# Patient Record
Sex: MALE | Race: WHITE | NOT HISPANIC OR LATINO
[De-identification: names, ages, dates, MRNs, and addresses within clinical notes are randomized per-mention and may not be internally consistent; named-entity substitution may affect disease eponyms.]

---

## 2017-08-01 ENCOUNTER — TRANSCRIPTION ENCOUNTER (OUTPATIENT)
Age: 62
End: 2017-08-01

## 2018-01-09 ENCOUNTER — TRANSCRIPTION ENCOUNTER (OUTPATIENT)
Age: 63
End: 2018-01-09

## 2019-07-24 PROBLEM — Z00.00 ENCOUNTER FOR PREVENTIVE HEALTH EXAMINATION: Status: ACTIVE | Noted: 2019-07-24

## 2019-08-09 ENCOUNTER — APPOINTMENT (OUTPATIENT)
Dept: OTOLARYNGOLOGY | Facility: CLINIC | Age: 64
End: 2019-08-09
Payer: COMMERCIAL

## 2019-08-09 VITALS — BODY MASS INDEX: 27.32 KG/M2 | HEIGHT: 66 IN | WEIGHT: 170 LBS

## 2019-08-09 DIAGNOSIS — Z87.09 PERSONAL HISTORY OF OTHER DISEASES OF THE RESPIRATORY SYSTEM: ICD-10-CM

## 2019-08-09 DIAGNOSIS — K21.9 GASTRO-ESOPHAGEAL REFLUX DISEASE W/OUT ESOPHAGITIS: ICD-10-CM

## 2019-08-09 DIAGNOSIS — R49.0 DYSPHONIA: ICD-10-CM

## 2019-08-09 DIAGNOSIS — Z87.19 PERSONAL HISTORY OF OTHER DISEASES OF THE DIGESTIVE SYSTEM: ICD-10-CM

## 2019-08-09 DIAGNOSIS — J34.2 DEVIATED NASAL SEPTUM: ICD-10-CM

## 2019-08-09 DIAGNOSIS — Z86.79 PERSONAL HISTORY OF OTHER DISEASES OF THE CIRCULATORY SYSTEM: ICD-10-CM

## 2019-08-09 DIAGNOSIS — Z86.73 PERSONAL HISTORY OF TRANSIENT ISCHEMIC ATTACK (TIA), AND CEREBRAL INFARCTION W/OUT RESIDUAL DEFICITS: ICD-10-CM

## 2019-08-09 DIAGNOSIS — Z87.39 PERSONAL HISTORY OF OTHER DISEASES OF THE MUSCULOSKELETAL SYSTEM AND CONNECTIVE TISSUE: ICD-10-CM

## 2019-08-09 DIAGNOSIS — J31.0 CHRONIC RHINITIS: ICD-10-CM

## 2019-08-09 PROCEDURE — 99214 OFFICE O/P EST MOD 30 MIN: CPT | Mod: 25

## 2019-08-09 PROCEDURE — 31231 NASAL ENDOSCOPY DX: CPT

## 2019-08-09 NOTE — REASON FOR VISIT
[Subsequent Evaluation] : a subsequent evaluation for [Gastroesophageal Reflux] : gastroesophageal reflux [Hoarseness/Dysphonia] : hoarseness

## 2019-08-09 NOTE — REVIEW OF SYSTEMS
[Sneezing] : sneezing [Seasonal Allergies] : seasonal allergies [Dizziness] : dizziness [Nasal Congestion] : nasal congestion [Lightheadedness] : lightheadedness [Hoarseness] : hoarseness [Sinus Pressure] : sinus pressure [Sinus Pain] : sinus pain [Throat Itching] : throat itching [Abdominal Pain] : abdominal pain [Diarrhea] : diarrhea [Constipation] : constipation [Heartburn] : heartburn [Anxiety] : anxiety [Negative] : Heme/Lymph

## 2019-08-29 ENCOUNTER — APPOINTMENT (OUTPATIENT)
Dept: OTOLARYNGOLOGY | Facility: CLINIC | Age: 64
End: 2019-08-29

## 2020-09-06 ENCOUNTER — TRANSCRIPTION ENCOUNTER (OUTPATIENT)
Age: 65
End: 2020-09-06

## 2020-09-06 ENCOUNTER — INPATIENT (INPATIENT)
Facility: HOSPITAL | Age: 65
LOS: 2 days | Discharge: HOME HEALTH | DRG: 562 | End: 2020-09-09
Attending: SURGERY | Admitting: SURGERY
Payer: COMMERCIAL

## 2020-09-06 ENCOUNTER — EMERGENCY (EMERGENCY)
Facility: HOSPITAL | Age: 65
LOS: 1 days | Discharge: ANOTHER HOSPITAL | End: 2020-09-06
Attending: EMERGENCY MEDICINE | Admitting: EMERGENCY MEDICINE
Payer: COMMERCIAL

## 2020-09-06 VITALS
SYSTOLIC BLOOD PRESSURE: 148 MMHG | HEART RATE: 78 BPM | DIASTOLIC BLOOD PRESSURE: 66 MMHG | OXYGEN SATURATION: 99 % | TEMPERATURE: 98 F | RESPIRATION RATE: 18 BRPM

## 2020-09-06 VITALS
HEART RATE: 71 BPM | TEMPERATURE: 98 F | DIASTOLIC BLOOD PRESSURE: 70 MMHG | SYSTOLIC BLOOD PRESSURE: 150 MMHG | OXYGEN SATURATION: 97 % | RESPIRATION RATE: 17 BRPM

## 2020-09-06 VITALS
RESPIRATION RATE: 18 BRPM | OXYGEN SATURATION: 98 % | HEIGHT: 66 IN | DIASTOLIC BLOOD PRESSURE: 75 MMHG | HEART RATE: 96 BPM | SYSTOLIC BLOOD PRESSURE: 184 MMHG | TEMPERATURE: 98 F | WEIGHT: 184.97 LBS

## 2020-09-06 DIAGNOSIS — Z98.890 OTHER SPECIFIED POSTPROCEDURAL STATES: Chronic | ICD-10-CM

## 2020-09-06 DIAGNOSIS — K60.2 ANAL FISSURE, UNSPECIFIED: Chronic | ICD-10-CM

## 2020-09-06 DIAGNOSIS — S62.101A FRACTURE OF UNSPECIFIED CARPAL BONE, RIGHT WRIST, INITIAL ENCOUNTER FOR CLOSED FRACTURE: ICD-10-CM

## 2020-09-06 LAB
ALBUMIN SERPL ELPH-MCNC: 3.9 G/DL — SIGNIFICANT CHANGE UP (ref 3.3–5.2)
ALP SERPL-CCNC: 42 U/L — SIGNIFICANT CHANGE UP (ref 40–120)
ALT FLD-CCNC: 46 U/L — HIGH
ANION GAP SERPL CALC-SCNC: 14 MMOL/L — SIGNIFICANT CHANGE UP (ref 5–17)
ANION GAP SERPL CALC-SCNC: 7 MMOL/L — SIGNIFICANT CHANGE UP (ref 5–17)
APTT BLD: 27.5 SEC — SIGNIFICANT CHANGE UP (ref 27.5–35.5)
APTT BLD: 28.4 SEC — SIGNIFICANT CHANGE UP (ref 27.5–35.5)
AST SERPL-CCNC: 44 U/L — HIGH
BASOPHILS # BLD AUTO: 0.04 K/UL — SIGNIFICANT CHANGE UP (ref 0–0.2)
BASOPHILS # BLD AUTO: 0.04 K/UL — SIGNIFICANT CHANGE UP (ref 0–0.2)
BASOPHILS NFR BLD AUTO: 0.4 % — SIGNIFICANT CHANGE UP (ref 0–2)
BASOPHILS NFR BLD AUTO: 0.4 % — SIGNIFICANT CHANGE UP (ref 0–2)
BILIRUB SERPL-MCNC: 0.9 MG/DL — SIGNIFICANT CHANGE UP (ref 0.4–2)
BUN SERPL-MCNC: 11 MG/DL — SIGNIFICANT CHANGE UP (ref 7–23)
BUN SERPL-MCNC: 11 MG/DL — SIGNIFICANT CHANGE UP (ref 8–20)
CALCIUM SERPL-MCNC: 9 MG/DL — SIGNIFICANT CHANGE UP (ref 8.5–10.1)
CALCIUM SERPL-MCNC: 9.1 MG/DL — SIGNIFICANT CHANGE UP (ref 8.6–10.2)
CHLORIDE SERPL-SCNC: 102 MMOL/L — SIGNIFICANT CHANGE UP (ref 96–108)
CHLORIDE SERPL-SCNC: 96 MMOL/L — LOW (ref 98–107)
CO2 SERPL-SCNC: 23 MMOL/L — SIGNIFICANT CHANGE UP (ref 22–29)
CO2 SERPL-SCNC: 26 MMOL/L — SIGNIFICANT CHANGE UP (ref 22–31)
CREAT SERPL-MCNC: 0.59 MG/DL — SIGNIFICANT CHANGE UP (ref 0.5–1.3)
CREAT SERPL-MCNC: 0.71 MG/DL — SIGNIFICANT CHANGE UP (ref 0.5–1.3)
EOSINOPHIL # BLD AUTO: 0.02 K/UL — SIGNIFICANT CHANGE UP (ref 0–0.5)
EOSINOPHIL # BLD AUTO: 0.04 K/UL — SIGNIFICANT CHANGE UP (ref 0–0.5)
EOSINOPHIL NFR BLD AUTO: 0.2 % — SIGNIFICANT CHANGE UP (ref 0–6)
EOSINOPHIL NFR BLD AUTO: 0.4 % — SIGNIFICANT CHANGE UP (ref 0–6)
ETHANOL SERPL-MCNC: <10 MG/DL — SIGNIFICANT CHANGE UP
GLUCOSE SERPL-MCNC: 110 MG/DL — HIGH (ref 70–99)
GLUCOSE SERPL-MCNC: 116 MG/DL — HIGH (ref 70–99)
HCT VFR BLD CALC: 39.4 % — SIGNIFICANT CHANGE UP (ref 39–50)
HCT VFR BLD CALC: 39.4 % — SIGNIFICANT CHANGE UP (ref 39–50)
HGB BLD-MCNC: 12.9 G/DL — LOW (ref 13–17)
HGB BLD-MCNC: 13 G/DL — SIGNIFICANT CHANGE UP (ref 13–17)
IMM GRANULOCYTES NFR BLD AUTO: 0.7 % — SIGNIFICANT CHANGE UP (ref 0–1.5)
IMM GRANULOCYTES NFR BLD AUTO: 0.8 % — SIGNIFICANT CHANGE UP (ref 0–1.5)
INR BLD: 1.01 RATIO — SIGNIFICANT CHANGE UP (ref 0.88–1.16)
INR BLD: 1.06 RATIO — SIGNIFICANT CHANGE UP (ref 0.88–1.16)
LIDOCAIN IGE QN: 16 U/L — LOW (ref 22–51)
LYMPHOCYTES # BLD AUTO: 1.07 K/UL — SIGNIFICANT CHANGE UP (ref 1–3.3)
LYMPHOCYTES # BLD AUTO: 1.17 K/UL — SIGNIFICANT CHANGE UP (ref 1–3.3)
LYMPHOCYTES # BLD AUTO: 11.2 % — LOW (ref 13–44)
LYMPHOCYTES # BLD AUTO: 11.5 % — LOW (ref 13–44)
MCHC RBC-ENTMCNC: 29.8 PG — SIGNIFICANT CHANGE UP (ref 27–34)
MCHC RBC-ENTMCNC: 30.3 PG — SIGNIFICANT CHANGE UP (ref 27–34)
MCHC RBC-ENTMCNC: 32.7 GM/DL — SIGNIFICANT CHANGE UP (ref 32–36)
MCHC RBC-ENTMCNC: 33 GM/DL — SIGNIFICANT CHANGE UP (ref 32–36)
MCV RBC AUTO: 90.4 FL — SIGNIFICANT CHANGE UP (ref 80–100)
MCV RBC AUTO: 92.5 FL — SIGNIFICANT CHANGE UP (ref 80–100)
MONOCYTES # BLD AUTO: 1.04 K/UL — HIGH (ref 0–0.9)
MONOCYTES # BLD AUTO: 1.1 K/UL — HIGH (ref 0–0.9)
MONOCYTES NFR BLD AUTO: 10.8 % — SIGNIFICANT CHANGE UP (ref 2–14)
MONOCYTES NFR BLD AUTO: 10.9 % — SIGNIFICANT CHANGE UP (ref 2–14)
NEUTROPHILS # BLD AUTO: 7.34 K/UL — SIGNIFICANT CHANGE UP (ref 1.8–7.4)
NEUTROPHILS # BLD AUTO: 7.77 K/UL — HIGH (ref 1.8–7.4)
NEUTROPHILS NFR BLD AUTO: 76.1 % — SIGNIFICANT CHANGE UP (ref 43–77)
NEUTROPHILS NFR BLD AUTO: 76.6 % — SIGNIFICANT CHANGE UP (ref 43–77)
NRBC # BLD: 0 /100 WBCS — SIGNIFICANT CHANGE UP (ref 0–0)
PLATELET # BLD AUTO: 227 K/UL — SIGNIFICANT CHANGE UP (ref 150–400)
PLATELET # BLD AUTO: 263 K/UL — SIGNIFICANT CHANGE UP (ref 150–400)
POTASSIUM SERPL-MCNC: 3.4 MMOL/L — LOW (ref 3.5–5.3)
POTASSIUM SERPL-MCNC: 3.7 MMOL/L — SIGNIFICANT CHANGE UP (ref 3.5–5.3)
POTASSIUM SERPL-SCNC: 3.4 MMOL/L — LOW (ref 3.5–5.3)
POTASSIUM SERPL-SCNC: 3.7 MMOL/L — SIGNIFICANT CHANGE UP (ref 3.5–5.3)
PROT SERPL-MCNC: 6.6 G/DL — SIGNIFICANT CHANGE UP (ref 6.6–8.7)
PROTHROM AB SERPL-ACNC: 11.8 SEC — SIGNIFICANT CHANGE UP (ref 10.6–13.6)
PROTHROM AB SERPL-ACNC: 12.3 SEC — SIGNIFICANT CHANGE UP (ref 10.6–13.6)
RBC # BLD: 4.26 M/UL — SIGNIFICANT CHANGE UP (ref 4.2–5.8)
RBC # BLD: 4.36 M/UL — SIGNIFICANT CHANGE UP (ref 4.2–5.8)
RBC # FLD: 13.2 % — SIGNIFICANT CHANGE UP (ref 10.3–14.5)
RBC # FLD: 13.2 % — SIGNIFICANT CHANGE UP (ref 10.3–14.5)
SARS-COV-2 RNA SPEC QL NAA+PROBE: SIGNIFICANT CHANGE UP
SARS-COV-2 RNA SPEC QL NAA+PROBE: SIGNIFICANT CHANGE UP
SODIUM SERPL-SCNC: 133 MMOL/L — LOW (ref 135–145)
SODIUM SERPL-SCNC: 135 MMOL/L — SIGNIFICANT CHANGE UP (ref 135–145)
WBC # BLD: 10.2 K/UL — SIGNIFICANT CHANGE UP (ref 3.8–10.5)
WBC # BLD: 9.58 K/UL — SIGNIFICANT CHANGE UP (ref 3.8–10.5)
WBC # FLD AUTO: 10.2 K/UL — SIGNIFICANT CHANGE UP (ref 3.8–10.5)
WBC # FLD AUTO: 9.58 K/UL — SIGNIFICANT CHANGE UP (ref 3.8–10.5)

## 2020-09-06 PROCEDURE — 71101 X-RAY EXAM UNILAT RIBS/CHEST: CPT

## 2020-09-06 PROCEDURE — 90471 IMMUNIZATION ADMIN: CPT

## 2020-09-06 PROCEDURE — 86901 BLOOD TYPING SEROLOGIC RH(D): CPT

## 2020-09-06 PROCEDURE — 85610 PROTHROMBIN TIME: CPT

## 2020-09-06 PROCEDURE — 99285 EMERGENCY DEPT VISIT HI MDM: CPT

## 2020-09-06 PROCEDURE — 73030 X-RAY EXAM OF SHOULDER: CPT | Mod: 26,RT

## 2020-09-06 PROCEDURE — 71045 X-RAY EXAM CHEST 1 VIEW: CPT | Mod: 26,59

## 2020-09-06 PROCEDURE — 80048 BASIC METABOLIC PNL TOTAL CA: CPT

## 2020-09-06 PROCEDURE — 71250 CT THORAX DX C-: CPT | Mod: 26

## 2020-09-06 PROCEDURE — 70486 CT MAXILLOFACIAL W/O DYE: CPT | Mod: 26

## 2020-09-06 PROCEDURE — 70486 CT MAXILLOFACIAL W/O DYE: CPT

## 2020-09-06 PROCEDURE — 72125 CT NECK SPINE W/O DYE: CPT | Mod: 26

## 2020-09-06 PROCEDURE — 99253 IP/OBS CNSLTJ NEW/EST LOW 45: CPT

## 2020-09-06 PROCEDURE — 73110 X-RAY EXAM OF WRIST: CPT | Mod: 26,50

## 2020-09-06 PROCEDURE — 85027 COMPLETE CBC AUTOMATED: CPT

## 2020-09-06 PROCEDURE — 86900 BLOOD TYPING SEROLOGIC ABO: CPT

## 2020-09-06 PROCEDURE — 73100 X-RAY EXAM OF WRIST: CPT | Mod: 26,RT,59

## 2020-09-06 PROCEDURE — 99222 1ST HOSP IP/OBS MODERATE 55: CPT

## 2020-09-06 PROCEDURE — 71250 CT THORAX DX C-: CPT

## 2020-09-06 PROCEDURE — 70450 CT HEAD/BRAIN W/O DYE: CPT | Mod: 26

## 2020-09-06 PROCEDURE — 36415 COLL VENOUS BLD VENIPUNCTURE: CPT

## 2020-09-06 PROCEDURE — 71101 X-RAY EXAM UNILAT RIBS/CHEST: CPT | Mod: 26

## 2020-09-06 PROCEDURE — 73100 X-RAY EXAM OF WRIST: CPT

## 2020-09-06 PROCEDURE — 73110 X-RAY EXAM OF WRIST: CPT | Mod: 26,50,77

## 2020-09-06 PROCEDURE — 71100 X-RAY EXAM RIBS UNI 2 VIEWS: CPT

## 2020-09-06 PROCEDURE — 99285 EMERGENCY DEPT VISIT HI MDM: CPT | Mod: 25

## 2020-09-06 PROCEDURE — 73130 X-RAY EXAM OF HAND: CPT | Mod: 26,LT

## 2020-09-06 PROCEDURE — 70450 CT HEAD/BRAIN W/O DYE: CPT

## 2020-09-06 PROCEDURE — 70450 CT HEAD/BRAIN W/O DYE: CPT | Mod: 26,77

## 2020-09-06 PROCEDURE — 72125 CT NECK SPINE W/O DYE: CPT

## 2020-09-06 PROCEDURE — 90715 TDAP VACCINE 7 YRS/> IM: CPT

## 2020-09-06 PROCEDURE — 85730 THROMBOPLASTIN TIME PARTIAL: CPT

## 2020-09-06 PROCEDURE — 86850 RBC ANTIBODY SCREEN: CPT

## 2020-09-06 PROCEDURE — 29125 APPL SHORT ARM SPLINT STATIC: CPT | Mod: RT

## 2020-09-06 PROCEDURE — 73110 X-RAY EXAM OF WRIST: CPT

## 2020-09-06 PROCEDURE — U0003: CPT

## 2020-09-06 PROCEDURE — 73030 X-RAY EXAM OF SHOULDER: CPT

## 2020-09-06 RX ORDER — OXYCODONE AND ACETAMINOPHEN 5; 325 MG/1; MG/1
1 TABLET ORAL ONCE
Refills: 0 | Status: DISCONTINUED | OUTPATIENT
Start: 2020-09-06 | End: 2020-09-06

## 2020-09-06 RX ORDER — ZOLPIDEM TARTRATE 10 MG/1
5 TABLET ORAL AT BEDTIME
Refills: 0 | Status: DISCONTINUED | OUTPATIENT
Start: 2020-09-06 | End: 2020-09-09

## 2020-09-06 RX ORDER — ACETAMINOPHEN 500 MG
650 TABLET ORAL ONCE
Refills: 0 | Status: COMPLETED | OUTPATIENT
Start: 2020-09-06 | End: 2020-09-06

## 2020-09-06 RX ORDER — ALPRAZOLAM 0.25 MG
1 TABLET ORAL
Qty: 0 | Refills: 0 | DISCHARGE

## 2020-09-06 RX ORDER — ACETAMINOPHEN 500 MG
650 TABLET ORAL EVERY 6 HOURS
Refills: 0 | Status: DISCONTINUED | OUTPATIENT
Start: 2020-09-06 | End: 2020-09-07

## 2020-09-06 RX ORDER — LOSARTAN POTASSIUM 100 MG/1
0 TABLET, FILM COATED ORAL
Qty: 0 | Refills: 0 | DISCHARGE

## 2020-09-06 RX ORDER — MORPHINE SULFATE 50 MG/1
2 CAPSULE, EXTENDED RELEASE ORAL EVERY 4 HOURS
Refills: 0 | Status: DISCONTINUED | OUTPATIENT
Start: 2020-09-06 | End: 2020-09-07

## 2020-09-06 RX ORDER — HYDROCHLOROTHIAZIDE 25 MG
25 TABLET ORAL DAILY
Refills: 0 | Status: DISCONTINUED | OUTPATIENT
Start: 2020-09-06 | End: 2020-09-09

## 2020-09-06 RX ORDER — ONDANSETRON 8 MG/1
4 TABLET, FILM COATED ORAL EVERY 6 HOURS
Refills: 0 | Status: DISCONTINUED | OUTPATIENT
Start: 2020-09-06 | End: 2020-09-09

## 2020-09-06 RX ORDER — LISINOPRIL 2.5 MG/1
10 TABLET ORAL DAILY
Refills: 0 | Status: DISCONTINUED | OUTPATIENT
Start: 2020-09-06 | End: 2020-09-09

## 2020-09-06 RX ORDER — ATENOLOL 25 MG/1
0 TABLET ORAL
Qty: 0 | Refills: 0 | DISCHARGE

## 2020-09-06 RX ORDER — FENTANYL CITRATE 50 UG/ML
50 INJECTION INTRAVENOUS ONCE
Refills: 0 | Status: DISCONTINUED | OUTPATIENT
Start: 2020-09-06 | End: 2020-09-06

## 2020-09-06 RX ORDER — FAMOTIDINE 10 MG/ML
20 INJECTION INTRAVENOUS
Refills: 0 | Status: DISCONTINUED | OUTPATIENT
Start: 2020-09-06 | End: 2020-09-09

## 2020-09-06 RX ORDER — PANTOPRAZOLE SODIUM 20 MG/1
40 TABLET, DELAYED RELEASE ORAL
Refills: 0 | Status: DISCONTINUED | OUTPATIENT
Start: 2020-09-06 | End: 2020-09-09

## 2020-09-06 RX ORDER — TETANUS TOXOID, REDUCED DIPHTHERIA TOXOID AND ACELLULAR PERTUSSIS VACCINE, ADSORBED 5; 2.5; 8; 8; 2.5 [IU]/.5ML; [IU]/.5ML; UG/.5ML; UG/.5ML; UG/.5ML
0.5 SUSPENSION INTRAMUSCULAR ONCE
Refills: 0 | Status: COMPLETED | OUTPATIENT
Start: 2020-09-06 | End: 2020-09-06

## 2020-09-06 RX ORDER — ALPRAZOLAM 0.25 MG
1 TABLET ORAL THREE TIMES A DAY
Refills: 0 | Status: DISCONTINUED | OUTPATIENT
Start: 2020-09-06 | End: 2020-09-09

## 2020-09-06 RX ADMIN — MORPHINE SULFATE 2 MILLIGRAM(S): 50 CAPSULE, EXTENDED RELEASE ORAL at 22:47

## 2020-09-06 RX ADMIN — TETANUS TOXOID, REDUCED DIPHTHERIA TOXOID AND ACELLULAR PERTUSSIS VACCINE, ADSORBED 0.5 MILLILITER(S): 5; 2.5; 8; 8; 2.5 SUSPENSION INTRAMUSCULAR at 12:55

## 2020-09-06 RX ADMIN — FENTANYL CITRATE 50 MICROGRAM(S): 50 INJECTION INTRAVENOUS at 21:10

## 2020-09-06 RX ADMIN — OXYCODONE AND ACETAMINOPHEN 1 TABLET(S): 5; 325 TABLET ORAL at 14:40

## 2020-09-06 RX ADMIN — OXYCODONE AND ACETAMINOPHEN 1 TABLET(S): 5; 325 TABLET ORAL at 15:30

## 2020-09-06 RX ADMIN — MORPHINE SULFATE 2 MILLIGRAM(S): 50 CAPSULE, EXTENDED RELEASE ORAL at 23:02

## 2020-09-06 RX ADMIN — OXYCODONE AND ACETAMINOPHEN 1 TABLET(S): 5; 325 TABLET ORAL at 18:16

## 2020-09-06 NOTE — DISCHARGE NOTE PROVIDER - HOSPITAL COURSE
64yM, pmhx of GERD, HTN, anxiety, depression, ptsd presents as trauma B activation , transfered from [facility] s/p fall at 5 am on 9/6. Reports he had xanax, benedryl and 3 glasses of wine the night prior, he fell down the stairs, unknown LOC but states he found himself on the ground bleeding. CT scans at [place] showed questionable acute on SDH, r parietal scalp hematoma. At arrival patient complained of b/l wrist pain. Patient had b/l wrist fractures noted on xray.      Patient was evaluated by 64yM, pmhx of GERD, HTN, anxiety, depression, ptsd presents as trauma B activation , transfered from [facility] s/p fall at 5 am on 9/6. Reports he had xanax, benedryl and 3 glasses of wine the night prior, he fell down the stairs, unknown LOC but states he found himself on the ground bleeding. CT scans at [place] showed questionable acute on SDH, r parietal scalp hematoma. At arrival patient complained of b/l wrist pain. Patient had b/l wrist fractures noted on xray.      Patient was evaluated by Ortho recommending NWB on RUE and left hand until follow up in clinic as outpatient with Dr. Donohue, and no surgical intervention required. Patient was evaluated by PT/OT who recommends discharge home with services. Patient was evaluated by Neurosurgery, Dr. Virgen, who recommends 64yM, pmhx of GERD, HTN, anxiety, depression, ptsd presents as trauma B activation , transfered from [facility] s/p fall at 5 am on 9/6. Reports he had xanax, benedryl and 3 glasses of wine the night prior, he fell down the stairs, unknown LOC but states he found himself on the ground bleeding. CT scans at [place] showed questionable acute on SDH, r parietal scalp hematoma. At arrival patient complained of b/l wrist pain. Patient had b/l wrist fractures noted on xray.      Patient was evaluated by Ortho recommending NWB on RUE and left hand until follow up in clinic as outpatient with Dr. Donohue, and no surgical intervention required. Patient was evaluated by PT/OT who recommends discharge home with services. Patient was evaluated by Neurosurgery, Dr. Virgen, who recommended no acute neurosurgical intervention, repeat CT head, which was stable on 9/8, and follow up in 3 weeks. Patient's pain was controlled with PO medication, and is currently tolerating diet, and hemodynamically stable, and ready for discharge with home services.

## 2020-09-06 NOTE — ED ADULT NURSE REASSESSMENT NOTE - NS ED NURSE REASSESS COMMENT FT1
pt refused tylenol states "it wont work, I need something stronger" Pt stated he took oxycodone @ around 0900 before he came.  BERTIN Carrasco made aware

## 2020-09-06 NOTE — DISCHARGE NOTE PROVIDER - PROVIDER TOKENS
PROVIDER:[TOKEN:[[id number]]] PROVIDER:[TOKEN:[[id number]],FOLLOWUP:[1 week]],PROVIDER:[TOKEN:[[id number]],FOLLOWUP:[2 weeks]]

## 2020-09-06 NOTE — CONSULT NOTE ADULT - ASSESSMENT
A/P: 64yMale s/p [facility] w/ right Distal Radius Fracture and left triquetral fracture  - Pain control  - Strict Ice/Elevation  - NWB RUE with splint and sling  - WBAT LUE in wrist immobilizer  - Keep splint clean/dry/intact;  - Encourage active finger motion to help with swelling  - Pt aware of possible need for surgical intervention of distal radius fracture. Will FU as outpatient  - Pt made aware of signs and symptoms of compartment syndrome. Aware of need to contact Doctor / Return to ED if symtoms arise  - All questions answered, Pt/family understand plan.  - Patient to be transferred to [facility] for neurosurgical evaluation. Orthopedic team there has been contacted and made aware.  - FU w/ Dr. Brooks in 2-3 days following discharge  - Discussed with attending who agrees with above plan  - Orthopedic stable for discharge.

## 2020-09-06 NOTE — ED PROVIDER NOTE - OBJECTIVE STATEMENT
63 y/o M pt with significant PMHx of HTN presents to the ED as a transfer from [facility]. As per EMS, patient mixed Benadryl, Xanax, and some wine around 05:00 today causing him to fall down his stairs. At [place] he was found to have two wrist fractures and a subdural hemorrhage. On 81mg of ASA but did not take his dose today. CODE TRAUMA B. 63 y/o M pt with significant PMHx of GERD, PTSD, HTN, anxiety and depression presents to the ED as a transfer from [facility]. As per EMS, patient mixed Benadryl, Xanax, and some wine last night and around 05:00 today he fell down his stairs. Unknown LOC, but patient states that he does not remember walking to his basement At [place] he was found to have acute on chronic SDH, right parietal scalp hematoma and two wrist fractures. Currently c/o bilateral wrist pain. On 81mg of ASA but did not take his dose today. CODE TRAUMA B.

## 2020-09-06 NOTE — ED PROVIDER NOTE - CLINICAL SUMMARY MEDICAL DECISION MAKING FREE TEXT BOX
c/o fall at 5 am today. pt reports last night she had 2 glasses of wine, Xanax, and Benadryl. pt reports she believes she was sleep walking in which she fell down the steps, uncertain of how many steps, reports solely recalling finding herself on the ground bleeding. Pt notes right shoulder pain, b/l wrist pain, right rib pain, right facial injury, and head laceration. Plan includes CT head r/o CVA, Ct maxillofacial and cervical r/o fx, xray shoulder/wrist/rib r/o fx, adacel, pain control, re-assess

## 2020-09-06 NOTE — ED PROVIDER NOTE - ATTENDING CONTRIBUTION TO CARE
I have personally performed a face to face diagnostic evaluation on this patient.  I have reviewed the PA note and agree with the history, exam, and plan of care, except as noted.  History and Exam by me shows  fall today from sleepwalking?  pt complained headache, neck pain, right chest and wrist pain.  pt is a n o x 3 and in nad.  head nc/ right posterior scalp swelling and tenderness.  right neck- muscle spasm.  right wrist- colles fx. lab, ct, ekg, transfer.

## 2020-09-06 NOTE — H&P ADULT - ASSESSMENT
64yM, trauma B activation, transfer from [place], [facility], b/l wrist fractures  -repeat head ct  -consult ortho and neurosurgery  -labs  -NPO  -admit   -pain control prn  -ciwa   -seen with Dr. Olivares

## 2020-09-06 NOTE — DISCHARGE NOTE PROVIDER - NSDCFUADDINST_GEN_ALL_CORE_FT
Orthopedic follow up recommendations:     * NWB of the RUE-- maintain splint at all times until f/u in the office, do not get wet  * NWB of the left hand-- maintain velcro splint at all times until f/u in the office  * F/u with Dr. Donohue in the office within 1 week

## 2020-09-06 NOTE — ED PROVIDER NOTE - CARE PLAN
Principal Discharge DX:	Wrist fracture, bilateral, closed, initial encounter  Secondary Diagnosis:	Closed head injury, initial encounter

## 2020-09-06 NOTE — ED ADULT TRIAGE NOTE - CHIEF COMPLAINT QUOTE
Pt p/w head pain, R shoulder and arm pain after trip and fall on steps this morning. - LOC, ASA daily

## 2020-09-06 NOTE — ED ADULT NURSE NOTE - CHIEF COMPLAINT QUOTE
pt arrive by ambulance, transferred from [facility] with reports of SDH, bilat wrist fx, lac to top of head after falling down stairs last night, code trauma B activation.

## 2020-09-06 NOTE — CONSULT NOTE ADULT - SUBJECTIVE AND OBJECTIVE BOX
64 RHD male with a history of HTN, GERD presents to the ED with R wrist pain and L hand pain after falling down a flight of stairs this morning. Admits to head strike with obvious laceration over the posterior occiput, although denies any visual changes/LOC, altered mental status. Patient denies any additional injuries or pain elsewhere. Localizing pain to the right wrist and left palmar/ulnar hand. Patient denies numbness, tingling or burning. Community ambulator at baseline with moderate activity status. Patient is requiring urgent neurosurgical evaluation at [facility]. Transfer pending.     PAST MEDICAL & SURGICAL HISTORY:  HTN (hypertension)  Anal fissure  History of rhinoplasty    Home Medications:  atenolol:  (06 Sep 2020 12:24)  hydroCHLOROthiazide:  (06 Sep 2020 12:24)  HYDROcodone:  (06 Sep 2020 12:24)  losartan:  (06 Sep 2020 12:24)  Protonix:  (06 Sep 2020 12:24)  Xanax 1 mg oral tablet: 1 tab(s) orally (06 Sep 2020 12:24)    Allergies    erythromycin (Hives)    Intolerances    Flexeril (Other)      PE   Gen: NAD, resting comfortably  UE:  Skin intact over the right hand/wrist, no open injuries, obvious deformity present with overlying swelling/echymosis.    +TTP over right wrist  +TTP over the left palmar/ulnar carpal bones  Limited ROM of wrist secondary to pain  Full mobility of the left hand/wrist  +AIN/PIN/Ulnar/Musc/Median  Radial pulse 2+  Compartments soft and compressible    Secondary Survey  UE: Skin intact, no bony tenderness or deformity  RLE: Skin intact, no bony tenderness or deformity  LLE: Skin intact, no bony tenderness or deformity  Spine: Skin intact, no bony tenderness or stepoffs    Imaging:  XRay R Wrist: Showing comminuted, dorsally angulated, intraarticular distal radius fracture  Xray L Hand: Nondisplaced triquetral fracture  CT Head: Subdural hematoma    Procedure Note:  After verbal consent obtained, 10 cc of 1% Lidocaine injected into area around fracture site as hematoma block. Sugartong splint applied to Forearm/Wrist and mold held. Post-reduction Xrays obtained which show improved alignment of R DR Fracture. Pt NVI post procedure. Pt tolerated procedure well.

## 2020-09-06 NOTE — ED PROVIDER NOTE - PROGRESS NOTE DETAILS
ortho paged for b/l wrist fracture, discussed with transfer center Discussed with Trauma butt from [place] [facility], accepting transfer, no acute intervention requested As per nurse Jaz transfer center did not take patient as ortho advised he was not done with his procedure, EMS left without my notification, Discussed with transfer center, neurosurgery/trauma advised to transfer immediately. Discussed with ortho, patient to be transferred immediately without delay.

## 2020-09-06 NOTE — CONSULT NOTE ADULT - SUBJECTIVE AND OBJECTIVE BOX
Pt Name: JAYLEN RIVERA    MRN: [id number]      Patient is a 64y Male transfer from [facility] for SDH c/o right wrist pain and left hand pain s/p fall down a few stairs. Pt denies numbness/tingling and has no other complaints at this time.  .      REVIEW OF SYSTEMS    General: Alert, responsive, in NAD    Skin/Breast: No rashes, no pruritis   	  Ophthalmologic: No visual changes. No redness.   	  ENMT:	No discharge. No swelling.    Respiratory and Thorax: No difficulty breathing. No cough.  	   Cardiovascular:	No chest pain. No palpitations.    Gastrointestinal:	 No abdominal pain. No diarrhea.     Genitourinary: No dysuria. No bleeding.    Musculoskeletal: SEE HPI.    Neurological: No sensory or motor changes.     Psychiatric: No anxiety or depression.    Hematology/Lymphatics: No swelling.    Endocrine: No Hx of diabetes.    ROS is otherwise negative.    PAST MEDICAL & SURGICAL HISTORY:  PAST MEDICAL & SURGICAL HISTORY:  Anxiety  HTN (hypertension)  Chronic GERD  No significant past surgical history      Allergies: Allergy Status Unknown      Medications: acetaminophen   Tablet .. 650 milliGRAM(s) Oral every 6 hours PRN  ALPRAZolam 1 milliGRAM(s) Oral three times a day PRN  famotidine    Tablet 20 milliGRAM(s) Oral two times a day  hydrochlorothiazide 25 milliGRAM(s) Oral daily  lisinopril 10 milliGRAM(s) Oral daily  morphine  - Injectable 2 milliGRAM(s) IV Push every 4 hours PRN  ondansetron Injectable 4 milliGRAM(s) IV Push every 6 hours PRN  pantoprazole    Tablet 40 milliGRAM(s) Oral before breakfast  zolpidem 5 milliGRAM(s) Oral at bedtime PRN  zolpidem 5 milliGRAM(s) Oral at bedtime PRN      FAMILY HISTORY:  No pertinent family history in first degree relatives  : non-contributory    Social History: Denies ETOH abuse    Ambulation: Walking independently [ x ] With Cane [ ] With Walker [ ]  Bedbound [ ]                           12.9   9.58  )-----------( 227      ( 06 Sep 2020 19:58 )             39.4       09-06    133<L>  |  96<L>  |  11.0  ----------------------------<  116<H>  3.4<L>   |  23.0  |  0.59    Ca    9.1      06 Sep 2020 19:58  Phos  2.8     09-06  Mg     2.2     09-06    TPro  6.6  /  Alb  3.9  /  TBili  0.9  /  DBili  0.2  /  AST  44<H>  /  ALT  46<H>  /  AlkPhos  42  09-06      Vital Signs Last 24 Hrs  T(C): 37 (06 Sep 2020 21:45), Max: 37 (06 Sep 2020 21:45)  T(F): 98.6 (06 Sep 2020 21:45), Max: 98.6 (06 Sep 2020 21:45)  HR: 78 (06 Sep 2020 21:45) (78 - 78)  BP: 167/79 (06 Sep 2020 21:45) (148/66 - 167/79)  BP(mean): --  RR: 20 (06 Sep 2020 21:45) (18 - 20)  SpO2: 96% (06 Sep 2020 21:45) (96% - 99%)    Daily     Daily       PHYSICAL EXAM:      Appearance: Alert, responsive, in no acute distress.    Neurological: Sensation is grossly intact to light touch. 5/5 motor function of all extremities. No focal deficits or weaknesses found.    Vascular: 2+ distal radial/DP/PT pulses. Cap refill < 2 sec. No signs of venous insufficiency or stasis. No extremity ulcerations. No cyanosis.    Musculoskeletal:         Left Upper Extremity: + TTP of the ulna aspect of the left hand, FROM of the shoulder/elbow/wrist, +flexion/extension/abduction/adduction of all digits, SILT, 2+ radial pulse palpated, compartments soft and compressible.       Right Upper Extremity: splint applied at [place] removed to examine wrist, + TTP of the right distal radius with decreased ROM noted. SILT. 2+ radial pulse palpated, +flexion/extension/abduction/adduction of all digits, compartments soft and compressible, FROM of shoulder/elbow with no TTP noted. No open wounds or active bleeding noted.       Left Lower Extremity:  + NROM. Non-tender. No signs of trauma.        Right Lower Extremity:  + NROM. Non-tender. No signs of trauma.     Imaging Studies: All imaging reviewed with Dr. Donohue.    Procedure: FRACTURE REDUCTION  PROCEDURE NOTE: Fracture reduction     Performed by:  Marcelo Dillon PA-C    Indication: Acute fracture with displacement, requiring fracture reduction.    Consent: The risks and benefits of the procedure including incomplete reduction, nerve damage and bleeding were explained and the patient verbalized their understanding and wished to proceed with the procedure. Written consent was obtained following the discussion.    Universal Protocol: a time out was performed and the correct patient and site were verified     Procedure: Neurovascular exam intact prior to fracture reduction.  Skin exam : No bleeding or lacerations at the fracture site. Anesthesia/pain control, using aseptic technique, was administered using a hematoma block of 10 ml of 1% lidocaine. Reduction of the right distal radius was accomplished via axial traction and careful manipulation. Following adequate reduction and alignment of the fractured bone, the fracture was immobilized with a well padded plaster splint. Distally, the extremity was neurovascular intact following the procedure.  The patient tolerated the procedure well.    Post reduction films obtained and demonstrated an adequate reduction.    Complications: None     A/P:  Pt is a  64y Male with a right distal radius fx requiring further reduction, as well as a left triquetrum fx (pt presented with velcro wrist splint)    PLAN d/w Dr. Donohue:   * No immediate orthopedic surgical intervention necessary at this time  * NWB of the RUE-- maintain splint at all times until f/u in the office, do not get wet  * NWB of the left hand-- maintain velcro splint at all times until f/u in the office  * F/u with Dr. Donohue in the office within 1 week  * Ortho stable Pt Name: JAYLEN RIVERA    MRN: [id number]      Patient is a 64y Male transfer from [facility] for SDH c/o right wrist pain and left hand pain s/p fall down a few stairs. Pt denies numbness/tingling and has no other complaints at this time.  .      REVIEW OF SYSTEMS    General: Alert, responsive, in NAD    Skin/Breast: No rashes, no pruritis   	  Ophthalmologic: No visual changes. No redness.   	  ENMT:	No discharge. No swelling.    Respiratory and Thorax: No difficulty breathing. No cough.  	   Cardiovascular:	No chest pain. No palpitations.    Gastrointestinal:	 No abdominal pain. No diarrhea.     Genitourinary: No dysuria. No bleeding.    Musculoskeletal: SEE HPI.    Neurological: No sensory or motor changes.     Psychiatric: No anxiety or depression.    Hematology/Lymphatics: No swelling.    Endocrine: No Hx of diabetes.    ROS is otherwise negative.    PAST MEDICAL & SURGICAL HISTORY:  PAST MEDICAL & SURGICAL HISTORY:  Anxiety  HTN (hypertension)  Chronic GERD  No significant past surgical history      Allergies: Allergy Status Unknown      Medications: acetaminophen   Tablet .. 650 milliGRAM(s) Oral every 6 hours PRN  ALPRAZolam 1 milliGRAM(s) Oral three times a day PRN  famotidine    Tablet 20 milliGRAM(s) Oral two times a day  hydrochlorothiazide 25 milliGRAM(s) Oral daily  lisinopril 10 milliGRAM(s) Oral daily  morphine  - Injectable 2 milliGRAM(s) IV Push every 4 hours PRN  ondansetron Injectable 4 milliGRAM(s) IV Push every 6 hours PRN  pantoprazole    Tablet 40 milliGRAM(s) Oral before breakfast  zolpidem 5 milliGRAM(s) Oral at bedtime PRN  zolpidem 5 milliGRAM(s) Oral at bedtime PRN      FAMILY HISTORY:  No pertinent family history in first degree relatives  : non-contributory    Social History: Denies ETOH abuse    Ambulation: Walking independently [ x ] With Cane [ ] With Walker [ ]  Bedbound [ ]                           12.9   9.58  )-----------( 227      ( 06 Sep 2020 19:58 )             39.4       09-06    133<L>  |  96<L>  |  11.0  ----------------------------<  116<H>  3.4<L>   |  23.0  |  0.59    Ca    9.1      06 Sep 2020 19:58  Phos  2.8     09-06  Mg     2.2     09-06    TPro  6.6  /  Alb  3.9  /  TBili  0.9  /  DBili  0.2  /  AST  44<H>  /  ALT  46<H>  /  AlkPhos  42  09-06      Vital Signs Last 24 Hrs  T(C): 37 (06 Sep 2020 21:45), Max: 37 (06 Sep 2020 21:45)  T(F): 98.6 (06 Sep 2020 21:45), Max: 98.6 (06 Sep 2020 21:45)  HR: 78 (06 Sep 2020 21:45) (78 - 78)  BP: 167/79 (06 Sep 2020 21:45) (148/66 - 167/79)  BP(mean): --  RR: 20 (06 Sep 2020 21:45) (18 - 20)  SpO2: 96% (06 Sep 2020 21:45) (96% - 99%)    Daily     Daily       PHYSICAL EXAM:      Appearance: Alert, responsive, in no acute distress.    Neurological: Sensation is grossly intact to light touch. 5/5 motor function of all extremities. No focal deficits or weaknesses found.    Vascular: 2+ distal radial/DP/PT pulses. Cap refill < 2 sec. No signs of venous insufficiency or stasis. No extremity ulcerations. No cyanosis.    Musculoskeletal:         Left Upper Extremity: + TTP of the ulna aspect of the left hand, FROM of the shoulder/elbow/wrist, +flexion/extension/abduction/adduction of all digits, SILT, 2+ radial pulse palpated, compartments soft and compressible.       Right Upper Extremity: splint applied at [place] removed to examine wrist, + TTP of the right distal radius with decreased ROM noted. SILT. 2+ radial pulse palpated, +flexion/extension/abduction/adduction of all digits, compartments soft and compressible, FROM of shoulder/elbow with no TTP noted. No open wounds or active bleeding noted.       Left Lower Extremity:  + NROM. Non-tender. No signs of trauma.        Right Lower Extremity:  + NROM. Non-tender. No signs of trauma.     Imaging Studies: All imaging reviewed with Dr. Donohue.    Procedure: FRACTURE REDUCTION  PROCEDURE NOTE: Fracture reduction     Performed by:  Marcelo Dillon PA-C    Indication: Acute fracture with displacement, requiring fracture reduction.    Consent: The risks and benefits of the procedure including incomplete reduction, nerve damage and bleeding were explained and the patient verbalized their understanding and wished to proceed with the procedure. Verbal consent was obtained following the discussion.    Universal Protocol: a time out was performed and the correct patient and site were verified     Procedure: Neurovascular exam intact prior to fracture reduction.  Skin exam : No bleeding or lacerations at the fracture site. Anesthesia/pain control, using aseptic technique, was administered using a hematoma block of 10 ml of 1% lidocaine. Reduction of the right distal radius was accomplished via axial traction and careful manipulation. Following adequate reduction and alignment of the fractured bone, the fracture was immobilized with a well padded plaster splint. Distally, the extremity was neurovascular intact following the procedure.  The patient tolerated the procedure well.    Post reduction films obtained and demonstrated an adequate reduction.    Complications: None     A/P:  Pt is a  64y Male with a right distal radius fx requiring further reduction, as well as a left triquetrum fx (pt presented with velcro wrist splint)    PLAN d/w Dr. Donohue:   * No immediate orthopedic surgical intervention necessary at this time  * NWB of the RUE-- maintain splint at all times until f/u in the office, do not get wet  * NWB of the left hand-- maintain velcro splint at all times until f/u in the office  * F/u with Dr. Donohue in the office within 1 week  * Ortho stable

## 2020-09-06 NOTE — H&P ADULT - NSCORESITESY/N_GEN_A_CORE_RD
Patient Information     Patient Name MRN Sex Elmo Drew [id number] Male 1982      Telephone Encounter by Quynh Mckeon RN at 10/13/2017  9:32 AM     Author:  Quynh Mckeon RN Service:  (none) Author Type:  NURS- Registered Nurse     Filed:  10/13/2017  9:32 AM Encounter Date:  10/12/2017 Status:  Signed     :  Quynh Mckeon RN (NURS- Registered Nurse)            Patient has not been seen in clinic since 16. At that time Prinzide was DC'd and was started on regular lisinopril. Patient requesting Prinzide with last refill 16. Unsure what patient is taking and due for annual exam. [facility] QUYNH MCKEON RN ....................  10/13/2017   9:32 AM            
Patient Information     Patient Name MRN Sex Elmo Drew [id number] Male 1982      Telephone Encounter by Quynh Mckeon RN at 10/13/2017 11:22 AM     Author:  Quynh Mckeon RN Service:  (none) Author Type:  NURS- Registered Nurse     Filed:  10/13/2017 11:25 AM Encounter Date:  10/12/2017 Status:  Signed     :  Quynh Mckeon RN (NURS- Registered Nurse)            Patient called back and verified that he is only taking plain lisinopril. Notified that he is due for annual exam as LOV was 2016. Transferred to appt line and limited refill given    Ace Inhibitors    Office visit in the past 12 months or per provider note.    Last visit with PATT SALDIVAR was on: 2016 in [facility]  Next visit with PATT SALDIVAR is on: No future appointment listed with this provider  Next visit with Family Practice is on: No future appointment listed in this department    Lab test requirements:  Creatinine and Potassium annually, if ordering lab, order BMP.  CREATININE (mg/dL)    Date Value   2016 0.86     POTASSIUM (mmol/L)    Date Value   2016 4.3       Max refill for 12 months from last office visit or per provider note    Prescription refilled per RN Medication Refill Policy.................... QUYNH MCKEON RN ....................  10/13/2017   11:23 AM              
No

## 2020-09-06 NOTE — H&P ADULT - NSHPPHYSICALEXAM_GEN_ALL_CORE
Constitutional: Well-developed well nourished Male in no acute distress  HEENT: R occipital hematoma, R lower eye lid echymosis, Head is normocephalic and atraumatic, maxillofacial structures stable, no blood or discharge from nares or oral cavity, no donald sign / racoon eyes, EOMI b/l, pupils [2 ]mm round and reactive to light b/l, no active drainage or redness  Neck: trachea midline  Respiratory: Breath sounds CTA b/l respirations are unlabored, no accessory muscle use, no conversational dyspnea  Cardiovascular: Regular rate & rhythm, +S1, S2  Chest: Chest wall is non-tender to palpation, no subQ emphysema or crepitus palpated  Gastrointestinal: Abdomen soft, non-tender, non-distended, no rebound tenderness / guarding, no ecchymosis or external signs of abdominal trauma  Extremities: b/l arms splintact - sensory motor function intact moving all extremities spontaneously, no point tenderness or deformity noted to upper or lower extremities b/l  Pelvis: stable  Vascular: 2+ radial, femoral, and DP pulses b/l  Neurological: GCS: 15 (4/5/6). A&O x 3; no gross sensory / motor / coordination deficits  Musculoskeletal: 5/5 strength of upper and lower extremities b/l  Back: no C/T/LS spine tenderness to palpation, no step-offs or signs of external trauma to the back

## 2020-09-06 NOTE — DISCHARGE NOTE PROVIDER - CARE PROVIDERS DIRECT ADDRESSES
[email or web address]

## 2020-09-06 NOTE — H&P ADULT - HISTORY OF PRESENT ILLNESS
64yM, pmhx of GERD, HTN, anxiety, depression, ptsd presents as trauma B activation , transfer from [facility] s/p fall at 5 am this morning. Reports he had xanax, benedryl and 3 glasses of wine last night, he fell down the stairs this AM, unknown LOC but states he found himself on the ground bleeding. CT scans at [place] shows acute on chronic SDH, r parietal scalp hematoma. Currently c/o b/l wrist pain. patient has b/l wrist fractures noted on xray. Primary survey intact, GCS 15. Secondary survey pertinent findings R occipital [id number].  Denies cp, sob, n/v/d, vision changes, HA. 64yM, pmhx of GERD, HTN, anxiety, depression, ptsd presents as trauma B activation , transfer from [facility] s/p fall at 5 am this morning. Reports he had xanax, benedryl and 3 glasses of wine last night, he fell down the stairs this AM, unknown LOC but states he found himself on the ground bleeding. CT scans at [place] shows questionable acute on SDH, r parietal scalp hematoma. Currently c/o b/l wrist pain. patient has b/l wrist fractures noted on xray. Primary survey intact, GCS 15. Secondary survey pertinent findings R occipital [id number].  Denies cp, sob, n/v/d, vision changes, HA.

## 2020-09-06 NOTE — DISCHARGE NOTE PROVIDER - NSDCCPCAREPLAN_GEN_ALL_CORE_FT
PRINCIPAL DISCHARGE DIAGNOSIS  Diagnosis: Wrist fracture, bilateral, closed, initial encounter  Assessment and Plan of Treatment: Follow up: Please call and make an appointment with Orthopedics surgery clinic in 1 week and with Neurosurgery clinic for 3 weeks after discharge . Also, please call and make an appointment with your primary care physician as per your usual schedule.   Activity: Non-weight bearing on the right arm and left hand until cleared by ortho.  Diet: May continue regular diet.  Medications: Please take all medications listed on your discharge paperwork as prescribed. Pain medication has been prescribed for you. Please, take it as it has been prescribed, do not drive or operate heavy machinery while taking narcotics.  You are encouraged to take over-the-counter tylenol and/or ibuprofen for pain relief when you feel your pain no longer warrants the use of narcotic pain medications, however DO NOT TAKE percocet and tylenol at the same time as they contain the same active ingredient (acetaminophen). Take only percocet OR tylenol.  Wound Care: Please, keep wound site clean and dry. You may shower, but do not bathe. Right finger laceration must be covered with Xeroform and dry dressing daily.  Patient is advised to RETURN TO THE EMERGENCY DEPARTMENT for any of the following - worsening pain, fever/chills, nausea/vomiting, altered mental status, chest pain, shortness of breath, or any other new / worsening symptom.        SECONDARY DISCHARGE DIAGNOSES  Diagnosis: Closed head injury, initial encounter  Assessment and Plan of Treatment: Please call and make an appointment with Neurosurgery clinic for 3 weeks after discharge

## 2020-09-06 NOTE — DISCHARGE NOTE PROVIDER - CARE PROVIDER_API CALL
Mahsa Donohue)  Orthopedics  [address] [place] [address], [address]  [place] [address]  Phone: [phone]  Fax: [phone]  Follow Up Time: Mahsa Donohue)  Orthopedics  [address] [facility], [address]  [place] [address]  Phone: [phone]  Fax: [phone]  Follow Up Time: 1 week    Jose Virgen  NEUROSURGERY  [address] [place] [address]  Phone: [phone]  Fax: [phone]  Follow Up Time: 2 weeks

## 2020-09-06 NOTE — ED ADULT TRIAGE NOTE - CHIEF COMPLAINT QUOTE
pt arrive by ambulance, transferred from [facility] for SDH, code trauma B activation. pt arrive by ambulance, transferred from [facility] with reports of SDH, bilat wrist fx, lac to top of head after falling down stairs last night, code trauma B activation.

## 2020-09-06 NOTE — DISCHARGE NOTE PROVIDER - NSDCMRMEDTOKEN_GEN_ALL_CORE_FT
Ambien 10 mg oral tablet: 1 tab(s) orally once a day (at bedtime), As Needed  hydroCHLOROthiazide 25 mg oral tablet: 1 tab(s) orally once a day  HYDROcodone 30 mg oral capsule, extended release: 1 cap(s) orally every 12 hours, As Needed  lisinopril 10 mg oral tablet: 1 tab(s) orally once a day  Pepcid 20 mg oral tablet: 1 tab(s) orally 2 times a day  Protonix 20 mg oral delayed release tablet: 1 tab(s) orally once a day  timolol 10 mg oral tablet: 1 tab(s) orally 2 times a day  Xanax 1 mg oral tablet: 1 tab(s) orally 3 times a day, As Needed Ambien 10 mg oral tablet: 1 tab(s) orally once a day (at bedtime), As Needed  atenolol:   hydroCHLOROthiazide:   hydroCHLOROthiazide 25 mg oral tablet: 1 tab(s) orally once a day  HYDROcodone:   HYDROcodone 30 mg oral capsule, extended release: 1 cap(s) orally every 12 hours, As Needed  lisinopril 10 mg oral tablet: 1 tab(s) orally once a day  losartan:   Pepcid 20 mg oral tablet: 1 tab(s) orally 2 times a day  Protonix:   Protonix 20 mg oral delayed release tablet: 1 tab(s) orally once a day  timolol 10 mg oral tablet: 1 tab(s) orally 2 times a day  Xanax 1 mg oral tablet: 1 tab(s) orally 3 times a day, As Needed  Xanax 1 mg oral tablet: 1 tab(s) orally acetaminophen 325 mg oral tablet: 3 tab(s) orally every 8 hours  aspirin 81 mg oral tablet, chewable: 1 tab(s) orally once a day  atenolol:   gabapentin 300 mg oral capsule: 1 cap(s) orally every 8 hours  hydroCHLOROthiazide 25 mg oral tablet: 1 tab(s) orally once a day  losartan:   oxyCODONE 5 mg oral tablet: 1 tab(s) orally every 6 hours, As needed, Moderate Pain (4 - 6)  Pepcid 20 mg oral tablet: 1 tab(s) orally 2 times a day  Protonix 20 mg oral delayed release tablet: 1 tab(s) orally once a day  timolol 10 mg oral tablet: 1 tab(s) orally 2 times a day  Xanax 1 mg oral tablet: 1 tab(s) orally acetaminophen 325 mg oral tablet: 3 tab(s) orally every 8 hours  aspirin 81 mg oral tablet, chewable: 1 tab(s) orally once a day  atenolol:   gabapentin 300 mg oral capsule: 1 cap(s) orally every 8 hours  hydroCHLOROthiazide 25 mg oral tablet: 1 tab(s) orally once a day  lidocaine 5% topical film: Apply topically to affected area once a day MDD:1   losartan:   oxyCODONE 5 mg oral tablet: 1 tab(s) orally every 6 hours, As needed, Moderate Pain (4 - 6)  Pepcid 20 mg oral tablet: 1 tab(s) orally 2 times a day  Protonix 20 mg oral delayed release tablet: 1 tab(s) orally once a day  Roxicodone 5 mg oral tablet: 1 tab(s) orally every 6 hours -for severe pain MDD:4 tablets  timolol 10 mg oral tablet: 1 tab(s) orally 2 times a day  Xanax 1 mg oral tablet: 1 tab(s) orally acetaminophen 325 mg oral tablet: 3 tab(s) orally every 8 hours  aspirin 81 mg oral tablet, chewable: 1 tab(s) orally once a day  atenolol:   gabapentin 300 mg oral capsule: 1 cap(s) orally every 8 hours  hydroCHLOROthiazide 25 mg oral tablet: 1 tab(s) orally once a day  lidocaine 5% topical film: Apply topically to affected area once a day MDD:1   losartan:   Pepcid 20 mg oral tablet: 1 tab(s) orally 2 times a day  Protonix 20 mg oral delayed release tablet: 1 tab(s) orally once a day  timolol 10 mg oral tablet: 1 tab(s) orally 2 times a day  Xanax 1 mg oral tablet: 1 tab(s) orally

## 2020-09-06 NOTE — ED PROVIDER NOTE - PHYSICAL EXAMINATION
Constitutional: Awake, Alert, non-toxic. NAD. Well appearing, well nourished.   HEAD: Normocephalic, (+) parietal scalp 2 cm laceration   EYES: PERRL, EOM intact, conjunctiva and sclera are clear bilaterally. (+) right sided periorbital swelling, TTP, and ecchymosis   ENT: No rhinorrhea, normal pharynx, patent, no tonsillar exudate or enlargement, mucous membranes pink/moist, no erythema, no drooling or stridor.   NECK: Supple, non-tender  BACK: No midline or paraspinal TTP of cervical/thoracic/lumbar spine, FROM. No ecchymosis or hematomas. (+) right trap muscle TTP   CARDIOVASCULAR: Normal S1, S2; regular rate and rhythm.  RESPIRATORY: Normal respiratory effort; breath sounds CTAB, no wheezes, rhonchi, or rales. Speaking in full sentences. No accessory muscle use.   ABDOMEN: Soft; non-tender, non-distended. no flank ecchymosis, negative CVA  EXTREMITIES: Full passive and active ROM in all extremities; (+) right wrist TTP, FROM, no hand TTP, no left wrist TTP, no right shoulder TTP, pain with flexion of right shoulder, (+) right posterior rib TTP, hip non-tender to palpation; distal pulses palpable and symmetric, no edema, no crepitus or step off  SKIN: Warm, dry; good skin turgor, no apparent lesions or rashes, no ecchymosis, brisk capillary refill.  NEURO: A&O x3. Sensory and motor functions are grossly intact. Speech is normal. Appearance and judgement seem appropriate for gender and age. No neurological deficits. Neurovascular sensation intact motor function 5/5 of upper and lower extremities, CN II-XII grossly intact, no ataxia, absent pronator drift, intact cerebellar function. Speech clear, without articulation or word-finding difficulties. Eyes- PERRL bilaterally. EOMs in tact. No nystagmus. No facial droop.

## 2020-09-06 NOTE — CONSULT NOTE ADULT - ASSESSMENT
64yM transferred from [facility] as a Trauma B s/p fall.  CTB done at [place] showing an acute of chronic SDH overlying the LEFT convexity. Patient does state he takes ASA daily.     Repeat CTH at [facility] upon arrival reviewed, per radiology there is no intracranial mass effect, hemorrhage, midline shift or abnormal extra axial fluid collection.     -Recommend repeat CTB in the morning  -Hold AC/AP medications   -Q4 neurochecks  -Will continue to follow, thank you

## 2020-09-06 NOTE — ED PROVIDER NOTE - OBJECTIVE STATEMENT
65 y/o male with PMHx GERD and HTN presents today c/o fall at 5 am today. pt reports last night she had 2 glasses of wine, Xanax, and Benadryl. pt reports she believes she was sleep walking in which she fell down the steps, uncertain of how many steps, reports solely recalling finding herself on the ground bleeding. Pt notes right shoulder pain, b/l wrist pain, right rib pain, right facial injury, and head laceration. pt describes pain as aching, non-radiating, and currently 8/10. pt reports not being UTD with tetanus. Pt denies numbness/weakness, cp, sob, fever, hemoptysis, abd pain, hematuria, seizure, dizziness, or any other complaints.

## 2020-09-06 NOTE — ED ADULT NURSE NOTE - NSIMPLEMENTINTERV_GEN_ALL_ED
Implemented All Fall Risk Interventions:  [place] to call system. Call bell, personal items and telephone within reach. Instruct patient to call for assistance. Room bathroom lighting operational. Non-slip footwear when patient is off stretcher. Physically safe environment: no spills, clutter or unnecessary equipment. Stretcher in lowest position, wheels locked, appropriate side rails in place. Provide visual cue, wrist band, yellow gown, etc. Monitor gait and stability. Monitor for mental status changes and reorient to person, place, and time. Review medications for side effects contributing to fall risk. Reinforce activity limits and safety measures with patient and family.

## 2020-09-06 NOTE — ED PROVIDER NOTE - MUSCULOSKELETAL, MLM
Spine appears normal, range of motion is not limited, no muscle or joint tenderness. CARLITA Spine appears normal, range of motion is not limited, no muscle or joint tenderness. Right arm in splint, and preformed wrist splint on the left.

## 2020-09-06 NOTE — CONSULT NOTE ADULT - SUBJECTIVE AND OBJECTIVE BOX
History of Present Illness:   64yM transferred from [facility] as a Trauma B s/p fall. PMHx of CVA 2013, GERD, HTN, anxiety, depression, PTSD. Patient states that last night around 8 pm he took xanax, 2 benadryl and had 3 glasses of wine because he had ran out of his Ambien which he usually takes to help him sleep and finally fell asleep around 2am. Around 530am patient states he is unclear what exactly happened but thinks he might have been sleep walking and found himself on the bottom of the stair landing and couldn't recall if he fell going down or up the steps. At that time he had called for help but his friend could not hear him so he then after sometime was able to get up. He decided to finally come to the hospital around 11am and had a CTB done at [place] showing an acute of chronic SDH overlying the LEFT convexity. Patient does state he takes ASA daily. Currently c/o bilateral UE pain, secondary to b/l wrist fractures noted on xray. Denies any neurological complaints at this time.       EXAM:     NAD  AAOx3  Follows all commands  Speech is raspy but fluent and easy to comprehend   PERRL, EOMI  Face symmetric  Tongue midline   RIGHT parietal scalp hematoma noted   C spine non tender  Motor:   RIGHT UE splint, able to full lift arm AG and squeeze my hand  RIGHT LE 5/5 throughout  LEFT UE brace, able to full lift arm AG and squeeze my hand  LEFT LE 5/5 throughout   Sensation intact to light touch, LEFT palm and foot numbness from residual stroke in 2013

## 2020-09-06 NOTE — ED ADULT NURSE NOTE - SUICIDE SCREENING QUESTION 3
Pt.is a 56year old female  with history of Schizoaffective Disorder, anxiety and PTSD.  Pt. Was admitted to this facility for medication non-compliance, paranoia  And thought blocking.      LLOYD discussed case with the treating psychiatrist      LLOYD Contact:   SW met with pt to discuss d/c planning. SW informed pt about services for women veterans,  SW [facility].   Pt. Is currently denying ideations and hallucinations. LLOYD encouraged pt to continue to take medication and attend aftercare appointments in the community. Pt. Is denying ideations and hallucinations. Pt is goal oriented.      D/C Plan: Pt . Stated she plans to return [facility] the [facility] in [address] Pt stated she will reside with her father. No

## 2020-09-06 NOTE — ED ADULT NURSE REASSESSMENT NOTE - NS ED NURSE REASSESS COMMENT FT1
EMS arrived ready to transport pt but ortho was with patient to splint wrist. EMS stated to call back when pt is ready

## 2020-09-06 NOTE — ED ADULT NURSE NOTE - OBJECTIVE STATEMENT
received pt in bed #18a Pt A&O states he drank 3-4 glasses of wine last night & also took xanax & benadryl & states he fell asleep @ 0200 then woke up @ around 0500 @ the bottom of the stairs. Pt c/o right arm/neck & rib pain/left wrist pain.  pt w/ bruising underneath right eye/face, abrasion to right side of face laceration to top of right side of head. Pt seen by BERTIN Carrasco

## 2020-09-07 LAB
ANION GAP SERPL CALC-SCNC: 13 MMOL/L — SIGNIFICANT CHANGE UP (ref 5–17)
BUN SERPL-MCNC: 13 MG/DL — SIGNIFICANT CHANGE UP (ref 8–20)
CALCIUM SERPL-MCNC: 8.8 MG/DL — SIGNIFICANT CHANGE UP (ref 8.6–10.2)
CHLORIDE SERPL-SCNC: 98 MMOL/L — SIGNIFICANT CHANGE UP (ref 98–107)
CO2 SERPL-SCNC: 25 MMOL/L — SIGNIFICANT CHANGE UP (ref 22–29)
CREAT SERPL-MCNC: 0.63 MG/DL — SIGNIFICANT CHANGE UP (ref 0.5–1.3)
GLUCOSE SERPL-MCNC: 108 MG/DL — HIGH (ref 70–99)
HCV AB S/CO SERPL IA: 0.1 S/CO — SIGNIFICANT CHANGE UP (ref 0–0.99)
HCV AB SERPL-IMP: SIGNIFICANT CHANGE UP
MAGNESIUM SERPL-MCNC: 2.2 MG/DL — SIGNIFICANT CHANGE UP (ref 1.6–2.6)
PHOSPHATE SERPL-MCNC: 3.2 MG/DL — SIGNIFICANT CHANGE UP (ref 2.4–4.7)
POTASSIUM SERPL-MCNC: 3.7 MMOL/L — SIGNIFICANT CHANGE UP (ref 3.5–5.3)
POTASSIUM SERPL-SCNC: 3.7 MMOL/L — SIGNIFICANT CHANGE UP (ref 3.5–5.3)
SODIUM SERPL-SCNC: 136 MMOL/L — SIGNIFICANT CHANGE UP (ref 135–145)

## 2020-09-07 PROCEDURE — 99232 SBSQ HOSP IP/OBS MODERATE 35: CPT

## 2020-09-07 RX ORDER — OXYCODONE HYDROCHLORIDE 5 MG/1
5 TABLET ORAL EVERY 6 HOURS
Refills: 0 | Status: DISCONTINUED | OUTPATIENT
Start: 2020-09-07 | End: 2020-09-09

## 2020-09-07 RX ORDER — KETOROLAC TROMETHAMINE 30 MG/ML
15 SYRINGE (ML) INJECTION ONCE
Refills: 0 | Status: DISCONTINUED | OUTPATIENT
Start: 2020-09-07 | End: 2020-09-07

## 2020-09-07 RX ORDER — POTASSIUM CHLORIDE 20 MEQ
40 PACKET (EA) ORAL ONCE
Refills: 0 | Status: COMPLETED | OUTPATIENT
Start: 2020-09-07 | End: 2020-09-07

## 2020-09-07 RX ORDER — KETOROLAC TROMETHAMINE 30 MG/ML
15 SYRINGE (ML) INJECTION EVERY 4 HOURS
Refills: 0 | Status: DISCONTINUED | OUTPATIENT
Start: 2020-09-07 | End: 2020-09-08

## 2020-09-07 RX ORDER — GABAPENTIN 400 MG/1
300 CAPSULE ORAL EVERY 8 HOURS
Refills: 0 | Status: DISCONTINUED | OUTPATIENT
Start: 2020-09-07 | End: 2020-09-09

## 2020-09-07 RX ORDER — POLYETHYLENE GLYCOL 3350 17 G/17G
17 POWDER, FOR SOLUTION ORAL DAILY
Refills: 0 | Status: DISCONTINUED | OUTPATIENT
Start: 2020-09-08 | End: 2020-09-09

## 2020-09-07 RX ORDER — ACETAMINOPHEN 500 MG
975 TABLET ORAL EVERY 8 HOURS
Refills: 0 | Status: DISCONTINUED | OUTPATIENT
Start: 2020-09-07 | End: 2020-09-09

## 2020-09-07 RX ORDER — OXYCODONE HYDROCHLORIDE 5 MG/1
10 TABLET ORAL EVERY 4 HOURS
Refills: 0 | Status: DISCONTINUED | OUTPATIENT
Start: 2020-09-07 | End: 2020-09-09

## 2020-09-07 RX ORDER — HYDROMORPHONE HYDROCHLORIDE 2 MG/ML
0.5 INJECTION INTRAMUSCULAR; INTRAVENOUS; SUBCUTANEOUS EVERY 6 HOURS
Refills: 0 | Status: DISCONTINUED | OUTPATIENT
Start: 2020-09-07 | End: 2020-09-09

## 2020-09-07 RX ORDER — HYDROMORPHONE HYDROCHLORIDE 2 MG/ML
0.5 INJECTION INTRAMUSCULAR; INTRAVENOUS; SUBCUTANEOUS ONCE
Refills: 0 | Status: DISCONTINUED | OUTPATIENT
Start: 2020-09-07 | End: 2020-09-07

## 2020-09-07 RX ADMIN — Medication 40 MILLIEQUIVALENT(S): at 17:26

## 2020-09-07 RX ADMIN — MORPHINE SULFATE 2 MILLIGRAM(S): 50 CAPSULE, EXTENDED RELEASE ORAL at 08:53

## 2020-09-07 RX ADMIN — MORPHINE SULFATE 2 MILLIGRAM(S): 50 CAPSULE, EXTENDED RELEASE ORAL at 13:16

## 2020-09-07 RX ADMIN — Medication 25 MILLIGRAM(S): at 05:07

## 2020-09-07 RX ADMIN — LISINOPRIL 10 MILLIGRAM(S): 2.5 TABLET ORAL at 05:07

## 2020-09-07 RX ADMIN — Medication 15 MILLIGRAM(S): at 17:23

## 2020-09-07 RX ADMIN — MORPHINE SULFATE 2 MILLIGRAM(S): 50 CAPSULE, EXTENDED RELEASE ORAL at 04:29

## 2020-09-07 RX ADMIN — PANTOPRAZOLE SODIUM 40 MILLIGRAM(S): 20 TABLET, DELAYED RELEASE ORAL at 05:07

## 2020-09-07 RX ADMIN — ZOLPIDEM TARTRATE 5 MILLIGRAM(S): 10 TABLET ORAL at 00:11

## 2020-09-07 RX ADMIN — HYDROMORPHONE HYDROCHLORIDE 0.5 MILLIGRAM(S): 2 INJECTION INTRAMUSCULAR; INTRAVENOUS; SUBCUTANEOUS at 21:50

## 2020-09-07 RX ADMIN — ZOLPIDEM TARTRATE 5 MILLIGRAM(S): 10 TABLET ORAL at 23:22

## 2020-09-07 RX ADMIN — Medication 975 MILLIGRAM(S): at 21:50

## 2020-09-07 RX ADMIN — Medication 975 MILLIGRAM(S): at 21:35

## 2020-09-07 RX ADMIN — MORPHINE SULFATE 2 MILLIGRAM(S): 50 CAPSULE, EXTENDED RELEASE ORAL at 08:37

## 2020-09-07 RX ADMIN — FAMOTIDINE 20 MILLIGRAM(S): 10 INJECTION INTRAVENOUS at 17:22

## 2020-09-07 RX ADMIN — MORPHINE SULFATE 2 MILLIGRAM(S): 50 CAPSULE, EXTENDED RELEASE ORAL at 04:44

## 2020-09-07 RX ADMIN — HYDROMORPHONE HYDROCHLORIDE 0.5 MILLIGRAM(S): 2 INJECTION INTRAMUSCULAR; INTRAVENOUS; SUBCUTANEOUS at 21:36

## 2020-09-07 RX ADMIN — Medication 15 MILLIGRAM(S): at 00:20

## 2020-09-07 RX ADMIN — MORPHINE SULFATE 2 MILLIGRAM(S): 50 CAPSULE, EXTENDED RELEASE ORAL at 17:21

## 2020-09-07 RX ADMIN — MORPHINE SULFATE 2 MILLIGRAM(S): 50 CAPSULE, EXTENDED RELEASE ORAL at 13:01

## 2020-09-07 RX ADMIN — MORPHINE SULFATE 2 MILLIGRAM(S): 50 CAPSULE, EXTENDED RELEASE ORAL at 17:39

## 2020-09-07 RX ADMIN — Medication 15 MILLIGRAM(S): at 17:39

## 2020-09-07 NOTE — PROGRESS NOTE ADULT - ASSESSMENT
64yM transferred from [facility] as a Trauma B s/p fall.  CTH from [place] showing an acute of chronic SDH overlying the LEFT convexity. Patient does state he takes ASA daily.   Repeat CTH at [facility] upon arrival reviewed, per radiology there is no intracranial mass effect, hemorrhage, midline shift or abnormal extra axial fluid collection. Pending repeat CTH today.     Plan:  - D/w Dr. Virgen   - f/u repeat CTH results   - Hold AC/AP medications   - Q4 neurochecks  - Tylenol and Morphine PRN for pain   - PT/OT

## 2020-09-07 NOTE — CHART NOTE - NSCHARTNOTEFT_GEN_A_CORE
Discussed need for repeat CT head per neurosurgery recommendations with patient, patient refusing at this time as he states he is concerned with hospital costs. Risks of refusing CT head discussed with patient including but not limited to worsening brain bleed causing neuro deficits, need for emergent operative intervention, possible ICU admission, death. Patient acknowledges risk and states understanding. Will d/c CT head at this time.

## 2020-09-07 NOTE — PROGRESS NOTE ADULT - SUBJECTIVE AND OBJECTIVE BOX
INTERVAL HPI/OVERNIGHT EVENTS:    SUBJECTIVE: Patient evaluated at bedside, found resting comfortably in bed, nad. Complaints of pain at RUE. Of note, patient also with herniated discs at C spine (known for past) and states lying in bed causing worsening back pain. Denies headache, changes in vision. Plan for repeat CT head today.       MEDICATIONS  (STANDING):  famotidine    Tablet 20 milliGRAM(s) Oral two times a day  hydrochlorothiazide 25 milliGRAM(s) Oral daily  lisinopril 10 milliGRAM(s) Oral daily  pantoprazole    Tablet 40 milliGRAM(s) Oral before breakfast    MEDICATIONS  (PRN):  acetaminophen   Tablet .. 650 milliGRAM(s) Oral every 6 hours PRN Mild Pain (1 - 3)  ALPRAZolam 1 milliGRAM(s) Oral three times a day PRN anxiety  morphine  - Injectable 2 milliGRAM(s) IV Push every 4 hours PRN Moderate Pain (4 - 6)  ondansetron Injectable 4 milliGRAM(s) IV Push every 6 hours PRN Nausea  zolpidem 5 milliGRAM(s) Oral at bedtime PRN Insomnia  zolpidem 5 milliGRAM(s) Oral at bedtime PRN Insomnia      Vital Signs Last 24 Hrs  T(C): 36.8 (07 Sep 2020 08:34), Max: 37.2 (07 Sep 2020 04:13)  T(F): 98.3 (07 Sep 2020 08:34), Max: 99 (07 Sep 2020 04:13)  HR: 75 (07 Sep 2020 08:34) (75 - 82)  BP: 128/72 (07 Sep 2020 08:34) (121/64 - 172/81)  BP(mean): --  RR: 18 (07 Sep 2020 08:34) (18 - 20)  SpO2: 96% (07 Sep 2020 08:34) (93% - 99%)    PE  Gen: resting comfortably in bed, nad  HEENT: ecchymosis to L eye  Pulm: no increased wob, no conversational dyspnea  Abd: non-distended, soft, non-tender  Ext: distal extremities warm and well-perfused, RUE with splint in place, R finger mildly swollen but fully sensory intact, decreased strength but motor intact to R fingers, LUE with brace in place, 5/5 strength to L fingers        I&O's Detail    06 Sep 2020 07:01  -  07 Sep 2020 07:00  --------------------------------------------------------  IN:  Total IN: 0 mL    OUT:    Voided: 250 mL  Total OUT: 250 mL    Total NET: -250 mL          LABS:                        12.9   9.58  )-----------( 227      ( 06 Sep 2020 19:58 )             39.4     09-07    136  |  98  |  13.0  ----------------------------<  108<H>  3.7   |  25.0  |  0.63    Ca    8.8      07 Sep 2020 05:40  Phos  3.2     09-07  Mg     2.2     09-07    TPro  6.6  /  Alb  3.9  /  TBili  0.9  /  DBili  0.2  /  AST  44<H>  /  ALT  46<H>  /  AlkPhos  42  09-06    PT/INR - ( 06 Sep 2020 19:58 )   PT: 12.3 sec;   INR: 1.06 ratio         PTT - ( 06 Sep 2020 19:58 )  PTT:27.5 sec      RADIOLOGY & ADDITIONAL STUDIES:

## 2020-09-07 NOTE — PROGRESS NOTE ADULT - SUBJECTIVE AND OBJECTIVE BOX
INTERVAL HPI/OVERNIGHT EVENTS:  64yM, pmhx of GERD, HTN, anxiety, depression, ptsd presents as trauma B activation, transfer from [facility] s/p fall at 5AM this morning. Reports he had xanax, benedryl and 3 glasses of wine last night at 8PM, he fell down the stairs this AM, unknown LOC but states he found himself on the ground bleeding. CT scans at [place] shows questionable acute on SDH, R parietal scalp hematoma. Currently c/o b/l wrist pain. patient has b/l wrist fractures noted on XR. Primary survey intact, GCS 15. Secondary survey pertinent findings R occipital hematoma.  Denies cp, sob, n/v/d, vision changes, HA. Patient does state that he takes ASA daily. (06 Sep 2020 19:13)    Patient seen and examined this morning, resting comfortably in bed. Complains of mild b/l wrist pain. Denies any numbness, changes in vision, weakness, headache, nausea, or vomiting.   Pending repeat CTH. No acute events overnight.     Vital Signs Last 24 Hrs  T(C): 36.8 (07 Sep 2020 08:34), Max: 37.2 (07 Sep 2020 04:13)  T(F): 98.3 (07 Sep 2020 08:34), Max: 99 (07 Sep 2020 04:13)  HR: 75 (07 Sep 2020 08:34) (75 - 82)  BP: 128/72 (07 Sep 2020 08:34) (121/64 - 172/81)  RR: 18 (07 Sep 2020 08:34) (18 - 20)  SpO2: 96% (07 Sep 2020 08:34) (93% - 99%)    PHYSICAL EXAM:  GENERAL: NAD, well-groomed, well-developed  HEAD: R parietal scalp hematoma   ANITA COMA SCORE: E-4 V-5 M-6 =15  MENTAL STATUS: A&Ox3, awake, opens eyes spontaneously. Appropriately conversant without aphasia, follows all commands   CRANIAL NERVES: PERRL, EOMI without nystagmus, face symmetric with normal eye closure and smile, tongue midline, hearing grossly intact, speech clear  MOTOR: 5/5 strength in all extremities   SENSATION: grossly intact to light touch all extremities  CHEST/LUNG: Nonlabored respirations, good inspiratory effort   HEART: +S1/+S2; RRR  ABDOMEN: Soft, nontender, nondistended  EXTREMITIES: RUE splint, LUE brace, no cyanosis or edema b/l      LABS:                        12.9   9.58  )-----------( 227      ( 06 Sep 2020 19:58 )             39.4     09-07    136  |  98  |  13.0  ----------------------------<  108<H>  3.7   |  25.0  |  0.63    Ca    8.8      07 Sep 2020 05:40  Phos  3.2     09-07  Mg     2.2     09-07    TPro  6.6  /  Alb  3.9  /  TBili  0.9  /  DBili  0.2  /  AST  44<H>  /  ALT  46<H>  /  AlkPhos  42  09-06    PT/INR - ( 06 Sep 2020 19:58 )   PT: 12.3 sec;   INR: 1.06 ratio         PTT - ( 06 Sep 2020 19:58 )  PTT:27.5 sec      09-06 @ 07:01  -  09-07 @ 07:00  --------------------------------------------------------  IN: 0 mL / OUT: 250 mL / NET: -250 mL        RADIOLOGY & ADDITIONAL TESTS:  < from: CT Head No Cont (09.06.20 @ 19:57) >  IMPRESSION:  Right parietal scalp hematoma laceration without foreign body or fracture. No acute intracranial bleeding.  < end of copied text >    < from: Xray Wrist 3 Views, Bilateral (09.06.20 @ 21:24) >  IMPRESSION:  Overlying splint obscures evaluation of the fine osseous and soft tissue details of the right wrist. Comminuted fracture of the right distal radius with intra-articular extension is noted.  No acute fracture or dislocation of the left wrist.  < end of copied text >    < from: Xray Chest 1 View AP/PA. (09.06.20 @ 20:07) >  IMPRESSION: No acute cardiopulmonary disease process.  < end of copied text >

## 2020-09-07 NOTE — PROGRESS NOTE ADULT - ASSESSMENT
64M presenting as trauma b activation s/p transfer after fall and found to have SDH, b/l wrist fractures.    -RUE elevation, strict  -splint to remain in place to RUE at all times  -repeat CT head today  -PT/OT  -pain management

## 2020-09-08 DIAGNOSIS — S62.101A FRACTURE OF UNSPECIFIED CARPAL BONE, RIGHT WRIST, INITIAL ENCOUNTER FOR CLOSED FRACTURE: ICD-10-CM

## 2020-09-08 PROBLEM — I10 ESSENTIAL (PRIMARY) HYPERTENSION: Chronic | Status: ACTIVE | Noted: 2020-09-06

## 2020-09-08 LAB
ANION GAP SERPL CALC-SCNC: 10 MMOL/L — SIGNIFICANT CHANGE UP (ref 5–17)
BASOPHILS # BLD AUTO: 0.04 K/UL — SIGNIFICANT CHANGE UP (ref 0–0.2)
BASOPHILS NFR BLD AUTO: 0.7 % — SIGNIFICANT CHANGE UP (ref 0–2)
BUN SERPL-MCNC: 22 MG/DL — HIGH (ref 8–20)
CALCIUM SERPL-MCNC: 8.9 MG/DL — SIGNIFICANT CHANGE UP (ref 8.6–10.2)
CHLORIDE SERPL-SCNC: 102 MMOL/L — SIGNIFICANT CHANGE UP (ref 98–107)
CO2 SERPL-SCNC: 25 MMOL/L — SIGNIFICANT CHANGE UP (ref 22–29)
CREAT SERPL-MCNC: 0.74 MG/DL — SIGNIFICANT CHANGE UP (ref 0.5–1.3)
EOSINOPHIL # BLD AUTO: 0.07 K/UL — SIGNIFICANT CHANGE UP (ref 0–0.5)
EOSINOPHIL NFR BLD AUTO: 1.3 % — SIGNIFICANT CHANGE UP (ref 0–6)
GLUCOSE SERPL-MCNC: 112 MG/DL — HIGH (ref 70–99)
HCT VFR BLD CALC: 37.6 % — LOW (ref 39–50)
HGB BLD-MCNC: 11.7 G/DL — LOW (ref 13–17)
IMM GRANULOCYTES NFR BLD AUTO: 0.6 % — SIGNIFICANT CHANGE UP (ref 0–1.5)
LYMPHOCYTES # BLD AUTO: 1.22 K/UL — SIGNIFICANT CHANGE UP (ref 1–3.3)
LYMPHOCYTES # BLD AUTO: 22.6 % — SIGNIFICANT CHANGE UP (ref 13–44)
MAGNESIUM SERPL-MCNC: 2.5 MG/DL — SIGNIFICANT CHANGE UP (ref 1.6–2.6)
MCHC RBC-ENTMCNC: 29.7 PG — SIGNIFICANT CHANGE UP (ref 27–34)
MCHC RBC-ENTMCNC: 31.1 GM/DL — LOW (ref 32–36)
MCV RBC AUTO: 95.4 FL — SIGNIFICANT CHANGE UP (ref 80–100)
MONOCYTES # BLD AUTO: 0.54 K/UL — SIGNIFICANT CHANGE UP (ref 0–0.9)
MONOCYTES NFR BLD AUTO: 10 % — SIGNIFICANT CHANGE UP (ref 2–14)
NEUTROPHILS # BLD AUTO: 3.49 K/UL — SIGNIFICANT CHANGE UP (ref 1.8–7.4)
NEUTROPHILS NFR BLD AUTO: 64.8 % — SIGNIFICANT CHANGE UP (ref 43–77)
PHOSPHATE SERPL-MCNC: 3.3 MG/DL — SIGNIFICANT CHANGE UP (ref 2.4–4.7)
PLATELET # BLD AUTO: 216 K/UL — SIGNIFICANT CHANGE UP (ref 150–400)
POTASSIUM SERPL-MCNC: 3.9 MMOL/L — SIGNIFICANT CHANGE UP (ref 3.5–5.3)
POTASSIUM SERPL-SCNC: 3.9 MMOL/L — SIGNIFICANT CHANGE UP (ref 3.5–5.3)
RBC # BLD: 3.94 M/UL — LOW (ref 4.2–5.8)
RBC # FLD: 13.5 % — SIGNIFICANT CHANGE UP (ref 10.3–14.5)
SODIUM SERPL-SCNC: 137 MMOL/L — SIGNIFICANT CHANGE UP (ref 135–145)
WBC # BLD: 5.39 K/UL — SIGNIFICANT CHANGE UP (ref 3.8–10.5)
WBC # FLD AUTO: 5.39 K/UL — SIGNIFICANT CHANGE UP (ref 3.8–10.5)

## 2020-09-08 PROCEDURE — 99232 SBSQ HOSP IP/OBS MODERATE 35: CPT

## 2020-09-08 PROCEDURE — 70450 CT HEAD/BRAIN W/O DYE: CPT | Mod: 26

## 2020-09-08 RX ORDER — TIMOLOL MALEATE 10 MG/1
1 TABLET ORAL
Qty: 0 | Refills: 0 | DISCHARGE

## 2020-09-08 RX ORDER — FAMOTIDINE 10 MG/ML
1 INJECTION INTRAVENOUS
Qty: 0 | Refills: 0 | DISCHARGE

## 2020-09-08 RX ORDER — ENOXAPARIN SODIUM 100 MG/ML
40 INJECTION SUBCUTANEOUS DAILY
Refills: 0 | Status: DISCONTINUED | OUTPATIENT
Start: 2020-09-08 | End: 2020-09-09

## 2020-09-08 RX ORDER — PANTOPRAZOLE SODIUM 20 MG/1
1 TABLET, DELAYED RELEASE ORAL
Qty: 0 | Refills: 0 | DISCHARGE

## 2020-09-08 RX ORDER — ASPIRIN/CALCIUM CARB/MAGNESIUM 324 MG
81 TABLET ORAL DAILY
Refills: 0 | Status: DISCONTINUED | OUTPATIENT
Start: 2020-09-08 | End: 2020-09-09

## 2020-09-08 RX ADMIN — OXYCODONE HYDROCHLORIDE 10 MILLIGRAM(S): 5 TABLET ORAL at 22:40

## 2020-09-08 RX ADMIN — Medication 975 MILLIGRAM(S): at 13:59

## 2020-09-08 RX ADMIN — PANTOPRAZOLE SODIUM 40 MILLIGRAM(S): 20 TABLET, DELAYED RELEASE ORAL at 05:13

## 2020-09-08 RX ADMIN — HYDROMORPHONE HYDROCHLORIDE 0.5 MILLIGRAM(S): 2 INJECTION INTRAMUSCULAR; INTRAVENOUS; SUBCUTANEOUS at 13:59

## 2020-09-08 RX ADMIN — OXYCODONE HYDROCHLORIDE 10 MILLIGRAM(S): 5 TABLET ORAL at 21:45

## 2020-09-08 RX ADMIN — ZOLPIDEM TARTRATE 5 MILLIGRAM(S): 10 TABLET ORAL at 00:58

## 2020-09-08 RX ADMIN — OXYCODONE HYDROCHLORIDE 10 MILLIGRAM(S): 5 TABLET ORAL at 12:13

## 2020-09-08 RX ADMIN — OXYCODONE HYDROCHLORIDE 10 MILLIGRAM(S): 5 TABLET ORAL at 07:36

## 2020-09-08 RX ADMIN — FAMOTIDINE 20 MILLIGRAM(S): 10 INJECTION INTRAVENOUS at 17:29

## 2020-09-08 RX ADMIN — Medication 81 MILLIGRAM(S): at 17:29

## 2020-09-08 RX ADMIN — Medication 975 MILLIGRAM(S): at 21:22

## 2020-09-08 RX ADMIN — HYDROMORPHONE HYDROCHLORIDE 0.5 MILLIGRAM(S): 2 INJECTION INTRAMUSCULAR; INTRAVENOUS; SUBCUTANEOUS at 14:28

## 2020-09-08 RX ADMIN — OXYCODONE HYDROCHLORIDE 10 MILLIGRAM(S): 5 TABLET ORAL at 17:31

## 2020-09-08 RX ADMIN — Medication 975 MILLIGRAM(S): at 14:00

## 2020-09-08 RX ADMIN — OXYCODONE HYDROCHLORIDE 10 MILLIGRAM(S): 5 TABLET ORAL at 13:15

## 2020-09-08 RX ADMIN — LISINOPRIL 10 MILLIGRAM(S): 2.5 TABLET ORAL at 05:13

## 2020-09-08 RX ADMIN — POLYETHYLENE GLYCOL 3350 17 GRAM(S): 17 POWDER, FOR SOLUTION ORAL at 08:02

## 2020-09-08 RX ADMIN — Medication 975 MILLIGRAM(S): at 22:20

## 2020-09-08 RX ADMIN — ONDANSETRON 4 MILLIGRAM(S): 8 TABLET, FILM COATED ORAL at 04:23

## 2020-09-08 RX ADMIN — OXYCODONE HYDROCHLORIDE 10 MILLIGRAM(S): 5 TABLET ORAL at 08:38

## 2020-09-08 RX ADMIN — ZOLPIDEM TARTRATE 5 MILLIGRAM(S): 10 TABLET ORAL at 23:17

## 2020-09-08 RX ADMIN — OXYCODONE HYDROCHLORIDE 10 MILLIGRAM(S): 5 TABLET ORAL at 18:20

## 2020-09-08 RX ADMIN — Medication 25 MILLIGRAM(S): at 05:13

## 2020-09-08 RX ADMIN — ENOXAPARIN SODIUM 40 MILLIGRAM(S): 100 INJECTION SUBCUTANEOUS at 17:29

## 2020-09-08 NOTE — PROGRESS NOTE ADULT - SUBJECTIVE AND OBJECTIVE BOX
SUBJECTIVE/24 hour events:  Patient is a 64yMale s/p fall down stairs sustaining right distal radius fx and l triquetrum fx. No acute events overnight, pain regimen adjusted for better control. Patient originally refusing head ct but now would like to have one ordered for this am. Patient needs ot/pt evaluation       Vital Signs Last 24 Hrs  T(C): 37.2 (07 Sep 2020 23:00), Max: 37.2 (07 Sep 2020 04:13)  T(F): 99 (07 Sep 2020 23:00), Max: 99 (07 Sep 2020 04:13)  HR: 68 (07 Sep 2020 23:00) (68 - 76)  BP: 124/77 (07 Sep 2020 23:00) (121/64 - 156/68)  BP(mean): --  RR: 18 (07 Sep 2020 23:00) (18 - 19)  SpO2: 96% (07 Sep 2020 23:00) (96% - 97%)  Drug Dosing Weight    I&O's Detail    06 Sep 2020 07:01  -  07 Sep 2020 07:00  --------------------------------------------------------  IN:  Total IN: 0 mL    OUT:    Voided: 250 mL  Total OUT: 250 mL    Total NET: -250 mL      07 Sep 2020 07:01  -  08 Sep 2020 03:34  --------------------------------------------------------  IN:  Total IN: 0 mL    OUT:  Total OUT: 0 mL    Total NET: 0 mL        Allergies    Allergy Status Unknown    Intolerances                              12.9   9.58  )-----------( 227      ( 06 Sep 2020 19:58 )             39.4   09-07    136  |  98  |  13.0  ----------------------------<  108<H>  3.7   |  25.0  |  0.63    Ca    8.8      07 Sep 2020 05:40  Phos  3.2     09-07  Mg     2.2     09-07    TPro  6.6  /  Alb  3.9  /  TBili  0.9  /  DBili  0.2  /  AST  44<H>  /  ALT  46<H>  /  AlkPhos  42  09-06  PT/INR - ( 06 Sep 2020 19:58 )   PT: 12.3 sec;   INR: 1.06 ratio         PTT - ( 06 Sep 2020 19:58 )  PTT:27.5 sec    ROS:    PHYSICAL EXAM:  Constitutional: " I am unhappy with the way the trauma spoke to me earlier"    Respiratory: no respiratory distress, no conversational dyspnea, no supplemental o2 needed    Cardiovascular: NSR    Gastrointestinal: abdomen soft, non-tender, atraumatic     Genitourinary: voiding spontaneously     Extremities: B/L ue nvi , in splints, LUE nwb left hand, RUE nwb    Neurological: A&OX3    Skin: warm, dry and no rashes         MEDICATIONS  (STANDING):  acetaminophen   Tablet .. 975 milliGRAM(s) Oral every 8 hours  famotidine    Tablet 20 milliGRAM(s) Oral two times a day  gabapentin 300 milliGRAM(s) Oral every 8 hours  hydrochlorothiazide 25 milliGRAM(s) Oral daily  lisinopril 10 milliGRAM(s) Oral daily  pantoprazole    Tablet 40 milliGRAM(s) Oral before breakfast  polyethylene glycol 3350 17 Gram(s) Oral daily    MEDICATIONS  (PRN):  ALPRAZolam 1 milliGRAM(s) Oral three times a day PRN anxiety  HYDROmorphone  Injectable 0.5 milliGRAM(s) IV Push every 6 hours PRN break thru pain  ketorolac   Injectable 15 milliGRAM(s) IV Push every 4 hours PRN Moderate Pain (4 - 6)  ondansetron Injectable 4 milliGRAM(s) IV Push every 6 hours PRN Nausea  oxyCODONE    IR 5 milliGRAM(s) Oral every 6 hours PRN Moderate Pain (4 - 6)  oxyCODONE    IR 10 milliGRAM(s) Oral every 4 hours PRN Severe Pain (7 - 10)  zolpidem 5 milliGRAM(s) Oral at bedtime PRN Insomnia  zolpidem 5 milliGRAM(s) Oral at bedtime PRN Insomnia      RADIOLOGY STUDIES:    CULTURES:

## 2020-09-08 NOTE — OCCUPATIONAL THERAPY INITIAL EVALUATION ADULT - LEVEL OF CONSCIOUSNESS, OT EVAL
Clinic notified of patients contrast allergy. Methylprednisolone ordered per protocol and approved by Dr. Danielle Smart RN     alert

## 2020-09-08 NOTE — CHART NOTE - NSCHARTNOTEFT_GEN_A_CORE
Spoke w/ radiologist Dr. Martinez regarding findings of repeat CT head. Dr. Martinez states that b/l SDHs visualized on this morning's CT scan are unchanged. CT read from 9/6 was read as NO acute intracranial bleed but Dr. Martinez states that this was misread and she identified b/l SDHs on both CT scans from 9/6 and today, 9/8, noting that bleeds are stable. Findings also d/w patient's RN.

## 2020-09-08 NOTE — OCCUPATIONAL THERAPY INITIAL EVALUATION ADULT - HOME MANAGEMENT SKILLS, PREVIOUS LEVEL OF FUNCTION, OT EVAL
needed assist/pt had difficulty due decreased coping ability regarding multiple losses within past 4 years.  He typically is responsible for cooking and food shopping, but his partner can assist.

## 2020-09-08 NOTE — OCCUPATIONAL THERAPY INITIAL EVALUATION ADULT - ANTICIPATED DISCHARGE DISPOSITION, OT EVAL
Home with outpatient OT once appropriate. Home with OT and outpatient OT once appropriate.  Assist from significant other as needed.

## 2020-09-08 NOTE — PHYSICAL THERAPY INITIAL EVALUATION ADULT - CLONUS, BILATERAL
Pt seen for low BMI. Pt lives alone; semi-independent; daughter-in-law shops; pt reports eating mostly convenience foods; quick meals. Denies any N/V/C/D or chew/swallowing difficulty. Pt reports wt loss at note; & says her appetite isn't what it once was.  Discussed c pt importance of  consuming adequate protein in diet to promote skin integrity & lean muscle mass; pt receptive to nutrition supplement.
absent

## 2020-09-08 NOTE — PHYSICAL THERAPY INITIAL EVALUATION ADULT - ADDITIONAL COMMENTS
Pt lives in a house with 3 steps to enter with 2  rails and  0 stairs inside.   Pt owns medical equipment: RW  Pt lives with: Partner   Someone is always available to provide assist.

## 2020-09-08 NOTE — OCCUPATIONAL THERAPY INITIAL EVALUATION ADULT - REFERRAL TO ANOTHER SERVICE NEEDED, OT EVAL
social work/difficulty coping with recent losses, recent move involving changes with health care providers, quarantine

## 2020-09-08 NOTE — OCCUPATIONAL THERAPY INITIAL EVALUATION ADULT - RANGE OF MOTION EXAMINATION, UPPER EXTREMITY
Bilateral shoulders WFL actively - pain with ROM on right shoulder.  Bilateral elbows WFL, right elbow mildly limited by splint.  Bilateral wrists immobilized by splints.  Right grasp impairment - limited by splint and swelling.  Pt demonstrates 1/2 active ROM, can oppose digits 2 and 3 only.  Can use right hand as active but not refined functional assist.  Left grasp/release WFL, mildly limited by splint but can use left hand as refined functional assist.

## 2020-09-08 NOTE — PROGRESS NOTE ADULT - PROBLEM SELECTOR PLAN 1
* No immediate orthopedic surgical intervention necessary at this time  * NWB of the RUE-- maintain splint at all times until f/u in the office, do not get wet  * NWB of the left hand-- maintain velcro splint at all times until f/u in the office  * bowel regimen  *pain control  *pt/ot

## 2020-09-08 NOTE — CHART NOTE - NSCHARTNOTEFT_GEN_A_CORE
Rn called to inform patient was woken up at 4am to have vital signs taken and was confused, did not know where he was. To note patient was given a dose of IV dilaudid and a total of 10 mg of ambien, the 2nd pill given at 1am. When I got to the bedside patient was appropriate A&OX3, recalling events of the evening. I ordered a head ct stat as patient was requesting the head ct he had originally refused on admission to be performed this am. Will continue with getting the ct scan, explained to the nurses that waking up someone who was given ambien recently it is not uncommon that they will be confused for a few minutes and their sensorium should improve.

## 2020-09-08 NOTE — OCCUPATIONAL THERAPY INITIAL EVALUATION ADULT - ADDITIONAL COMMENTS
Pt lives in private home with 3 steps to enter, 2 HRs in reach.  Once inside there are no stairs he needs to manage.   Laundry is down a flight of stairs, but pt does not need to use them.  Pt drives.  Pt has RW.

## 2020-09-08 NOTE — PHYSICAL THERAPY INITIAL EVALUATION ADULT - GAIT TRAINING, PT EVAL
Time Frame:   1  days   Goal:  Independent x 350 feet  / Stairs: pt is independent with stairs at [facility]

## 2020-09-08 NOTE — OCCUPATIONAL THERAPY INITIAL EVALUATION ADULT - PLANNED THERAPY INTERVENTIONS, OT EVAL
ADL retraining/transfer training/balance training/fine motor coordination training/ROM/IADL retraining

## 2020-09-08 NOTE — OCCUPATIONAL THERAPY INITIAL EVALUATION ADULT - LEVEL OF INDEPENDENCE:TOILET, OT EVAL
minimum assist (75% patients effort)/anticipated for BM, due to bilateral UE splints.  Pt manages bladder with supervision for unsteadiness.

## 2020-09-08 NOTE — OCCUPATIONAL THERAPY INITIAL EVALUATION ADULT - NS ASR OT EQUIP NEEDS DISCH
Pending progress, pt may benefit from commode for increased independence with toileting.  Ongoing assessment for further equipment needs.

## 2020-09-08 NOTE — CHART NOTE - NSCHARTNOTEFT_GEN_A_CORE
64yM transferred from [facility] as a Trauma B s/p fall.  CTB done at [place] showing an acute of chronic SDH overlying the LEFT convexity.   Repeat CTH reviewed from this AM- stable. No acute NS intervention indicated. Patient may resume Asprin 81mg.   F/U with Dr. Virgen outpatient in 3 weeks with repeat CTH. 64yM transferred from [facility] as a Trauma B s/p fall.  CTB done at [place] showing an acute of chronic SDH overlying the LEFT convexity.   Repeat CTH reviewed from this AM- stable. No acute NS intervention indicated. Patient may resume Asprin 81mg.   F/U with Dr. Virgen outpatient in 3 weeks.

## 2020-09-08 NOTE — PROGRESS NOTE ADULT - PROBLEM/PLAN-1
Please advise that the liver hemangioma is stable in size. Given that it is not yet regressing I would recommend a repeat US in 2 months to ensure that it is getting smaller. This could be done at their home hospital.    DISPLAY PLAN FREE TEXT

## 2020-09-09 ENCOUNTER — TRANSCRIPTION ENCOUNTER (OUTPATIENT)
Age: 65
End: 2020-09-09

## 2020-09-09 VITALS
HEART RATE: 90 BPM | DIASTOLIC BLOOD PRESSURE: 95 MMHG | TEMPERATURE: 98 F | SYSTOLIC BLOOD PRESSURE: 166 MMHG | OXYGEN SATURATION: 97 % | RESPIRATION RATE: 18 BRPM

## 2020-09-09 PROCEDURE — 80307 DRUG TEST PRSMV CHEM ANLYZR: CPT

## 2020-09-09 PROCEDURE — 83735 ASSAY OF MAGNESIUM: CPT

## 2020-09-09 PROCEDURE — 70450 CT HEAD/BRAIN W/O DYE: CPT

## 2020-09-09 PROCEDURE — 86900 BLOOD TYPING SEROLOGIC ABO: CPT

## 2020-09-09 PROCEDURE — 71045 X-RAY EXAM CHEST 1 VIEW: CPT

## 2020-09-09 PROCEDURE — 86850 RBC ANTIBODY SCREEN: CPT

## 2020-09-09 PROCEDURE — 86901 BLOOD TYPING SEROLOGIC RH(D): CPT

## 2020-09-09 PROCEDURE — 85027 COMPLETE CBC AUTOMATED: CPT

## 2020-09-09 PROCEDURE — 85730 THROMBOPLASTIN TIME PARTIAL: CPT

## 2020-09-09 PROCEDURE — 83605 ASSAY OF LACTIC ACID: CPT

## 2020-09-09 PROCEDURE — 80048 BASIC METABOLIC PNL TOTAL CA: CPT

## 2020-09-09 PROCEDURE — 96374 THER/PROPH/DIAG INJ IV PUSH: CPT

## 2020-09-09 PROCEDURE — 83690 ASSAY OF LIPASE: CPT

## 2020-09-09 PROCEDURE — 99285 EMERGENCY DEPT VISIT HI MDM: CPT | Mod: 25

## 2020-09-09 PROCEDURE — 80076 HEPATIC FUNCTION PANEL: CPT

## 2020-09-09 PROCEDURE — 36415 COLL VENOUS BLD VENIPUNCTURE: CPT

## 2020-09-09 PROCEDURE — 85610 PROTHROMBIN TIME: CPT

## 2020-09-09 PROCEDURE — 73110 X-RAY EXAM OF WRIST: CPT

## 2020-09-09 PROCEDURE — 87635 SARS-COV-2 COVID-19 AMP PRB: CPT

## 2020-09-09 PROCEDURE — 80053 COMPREHEN METABOLIC PANEL: CPT

## 2020-09-09 PROCEDURE — 73130 X-RAY EXAM OF HAND: CPT

## 2020-09-09 PROCEDURE — 97163 PT EVAL HIGH COMPLEX 45 MIN: CPT

## 2020-09-09 PROCEDURE — 84100 ASSAY OF PHOSPHORUS: CPT

## 2020-09-09 PROCEDURE — 99232 SBSQ HOSP IP/OBS MODERATE 35: CPT

## 2020-09-09 PROCEDURE — 86803 HEPATITIS C AB TEST: CPT

## 2020-09-09 RX ORDER — OXYCODONE HYDROCHLORIDE 5 MG/1
1 TABLET ORAL
Qty: 28 | Refills: 0
Start: 2020-09-09 | End: 2020-09-15

## 2020-09-09 RX ORDER — LISINOPRIL 2.5 MG/1
1 TABLET ORAL
Qty: 0 | Refills: 0 | DISCHARGE

## 2020-09-09 RX ORDER — OXYCODONE HYDROCHLORIDE 5 MG/1
1 TABLET ORAL
Qty: 0 | Refills: 0 | DISCHARGE
Start: 2020-09-09

## 2020-09-09 RX ORDER — LIDOCAINE 4 G/100G
1 CREAM TOPICAL
Qty: 5 | Refills: 0
Start: 2020-09-09 | End: 2020-09-13

## 2020-09-09 RX ORDER — PANTOPRAZOLE SODIUM 20 MG/1
0 TABLET, DELAYED RELEASE ORAL
Qty: 0 | Refills: 0 | DISCHARGE

## 2020-09-09 RX ORDER — OXYCODONE HYDROCHLORIDE 5 MG/1
1 TABLET ORAL
Qty: 20 | Refills: 0
Start: 2020-09-09

## 2020-09-09 RX ORDER — GABAPENTIN 400 MG/1
1 CAPSULE ORAL
Qty: 0 | Refills: 0 | DISCHARGE
Start: 2020-09-09

## 2020-09-09 RX ORDER — ACETAMINOPHEN 500 MG
3 TABLET ORAL
Qty: 0 | Refills: 0 | DISCHARGE
Start: 2020-09-09

## 2020-09-09 RX ORDER — HYDROCODONE BITARTRATE 50 MG/1
0 CAPSULE, EXTENDED RELEASE ORAL
Qty: 0 | Refills: 0 | DISCHARGE

## 2020-09-09 RX ORDER — ASPIRIN/CALCIUM CARB/MAGNESIUM 324 MG
1 TABLET ORAL
Qty: 0 | Refills: 0 | DISCHARGE
Start: 2020-09-09

## 2020-09-09 RX ORDER — ALPRAZOLAM 0.25 MG
1 TABLET ORAL
Qty: 0 | Refills: 0 | DISCHARGE

## 2020-09-09 RX ORDER — ZOLPIDEM TARTRATE 10 MG/1
1 TABLET ORAL
Qty: 0 | Refills: 0 | DISCHARGE

## 2020-09-09 RX ORDER — HYDROCHLOROTHIAZIDE 25 MG
0 TABLET ORAL
Qty: 0 | Refills: 0 | DISCHARGE

## 2020-09-09 RX ORDER — HYDROCODONE BITARTRATE 50 MG/1
1 CAPSULE, EXTENDED RELEASE ORAL
Qty: 0 | Refills: 0 | DISCHARGE

## 2020-09-09 RX ADMIN — Medication 975 MILLIGRAM(S): at 05:44

## 2020-09-09 RX ADMIN — Medication 975 MILLIGRAM(S): at 15:00

## 2020-09-09 RX ADMIN — Medication 975 MILLIGRAM(S): at 14:03

## 2020-09-09 RX ADMIN — Medication 25 MILLIGRAM(S): at 05:44

## 2020-09-09 RX ADMIN — Medication 81 MILLIGRAM(S): at 12:16

## 2020-09-09 RX ADMIN — LISINOPRIL 10 MILLIGRAM(S): 2.5 TABLET ORAL at 05:44

## 2020-09-09 RX ADMIN — OXYCODONE HYDROCHLORIDE 10 MILLIGRAM(S): 5 TABLET ORAL at 05:44

## 2020-09-09 RX ADMIN — GABAPENTIN 300 MILLIGRAM(S): 400 CAPSULE ORAL at 14:03

## 2020-09-09 RX ADMIN — PANTOPRAZOLE SODIUM 40 MILLIGRAM(S): 20 TABLET, DELAYED RELEASE ORAL at 05:44

## 2020-09-09 RX ADMIN — OXYCODONE HYDROCHLORIDE 5 MILLIGRAM(S): 5 TABLET ORAL at 13:15

## 2020-09-09 RX ADMIN — OXYCODONE HYDROCHLORIDE 5 MILLIGRAM(S): 5 TABLET ORAL at 12:16

## 2020-09-09 RX ADMIN — Medication 975 MILLIGRAM(S): at 06:40

## 2020-09-09 RX ADMIN — OXYCODONE HYDROCHLORIDE 10 MILLIGRAM(S): 5 TABLET ORAL at 06:40

## 2020-09-09 RX ADMIN — ENOXAPARIN SODIUM 40 MILLIGRAM(S): 100 INJECTION SUBCUTANEOUS at 12:16

## 2020-09-09 RX ADMIN — FAMOTIDINE 20 MILLIGRAM(S): 10 INJECTION INTRAVENOUS at 05:44

## 2020-09-09 NOTE — DISCHARGE NOTE NURSING/CASE MANAGEMENT/SOCIAL WORK - NSDCPEEMAIL_GEN_ALL_CORE
[facility] email [email or web address]

## 2020-09-09 NOTE — DISCHARGE NOTE NURSING/CASE MANAGEMENT/SOCIAL WORK - PATIENT PORTAL LINK FT
no
You can access the FollowMyHealth Patient Portal offered by [facility] by registering at the following website: [email or web address]. By joining [employer]’s FollowMyHealth portal, you will also be able to view your health information using other applications (apps) compatible with our system.

## 2020-09-09 NOTE — PROGRESS NOTE ADULT - ASSESSMENT
63 yo M with bilateral wrist fracture and stable SDH currently stable with no indication for surgical intervention. Patient is hemodinamically stable and pain is controlled with PO medications ready to be discharged home with follow-up with ortho and neurosurgery.  - NWB of the RUE-- maintain splint at all times until f/u in the office, do not get wet  - NWB of the left hand-- maintain velcro splint at all times until f/u in the office  -pain control    Watson Sarabia M.D

## 2020-09-09 NOTE — DISCHARGE NOTE NURSING/CASE MANAGEMENT/SOCIAL WORK - NSDCPEWEB_GEN_ALL_CORE
[place] website --- [email or web address] [email or web address] website --- [email or web address]

## 2020-09-10 ENCOUNTER — TRANSCRIPTION ENCOUNTER (OUTPATIENT)
Age: 65
End: 2020-09-10

## 2020-09-11 ENCOUNTER — TRANSCRIPTION ENCOUNTER (OUTPATIENT)
Age: 65
End: 2020-09-11

## 2020-09-11 LAB — DRUG SCREEN, SERUM: SIGNIFICANT CHANGE UP

## 2020-09-12 ENCOUNTER — EMERGENCY (EMERGENCY)
Facility: HOSPITAL | Age: 65
LOS: 1 days | Discharge: HOME | End: 2020-09-12
Attending: EMERGENCY MEDICINE | Admitting: EMERGENCY MEDICINE
Payer: COMMERCIAL

## 2020-09-12 VITALS
HEIGHT: 66 IN | TEMPERATURE: 99 F | HEART RATE: 66 BPM | DIASTOLIC BLOOD PRESSURE: 78 MMHG | SYSTOLIC BLOOD PRESSURE: 150 MMHG | WEIGHT: 179.9 LBS | OXYGEN SATURATION: 97 % | RESPIRATION RATE: 18 BRPM

## 2020-09-12 VITALS
TEMPERATURE: 98 F | OXYGEN SATURATION: 98 % | DIASTOLIC BLOOD PRESSURE: 65 MMHG | HEART RATE: 60 BPM | RESPIRATION RATE: 18 BRPM | SYSTOLIC BLOOD PRESSURE: 137 MMHG

## 2020-09-12 DIAGNOSIS — Z98.890 OTHER SPECIFIED POSTPROCEDURAL STATES: Chronic | ICD-10-CM

## 2020-09-12 DIAGNOSIS — K60.2 ANAL FISSURE, UNSPECIFIED: Chronic | ICD-10-CM

## 2020-09-12 PROCEDURE — 99284 EMERGENCY DEPT VISIT MOD MDM: CPT | Mod: 25

## 2020-09-12 PROCEDURE — 99285 EMERGENCY DEPT VISIT HI MDM: CPT

## 2020-09-12 PROCEDURE — 70450 CT HEAD/BRAIN W/O DYE: CPT | Mod: 26

## 2020-09-12 PROCEDURE — 70450 CT HEAD/BRAIN W/O DYE: CPT

## 2020-09-12 RX ORDER — OXYCODONE AND ACETAMINOPHEN 5; 325 MG/1; MG/1
1 TABLET ORAL ONCE
Refills: 0 | Status: DISCONTINUED | OUTPATIENT
Start: 2020-09-12 | End: 2020-09-12

## 2020-09-12 RX ADMIN — OXYCODONE AND ACETAMINOPHEN 1 TABLET(S): 5; 325 TABLET ORAL at 17:36

## 2020-09-12 RX ADMIN — OXYCODONE AND ACETAMINOPHEN 1 TABLET(S): 5; 325 TABLET ORAL at 19:00

## 2020-09-12 NOTE — ED PROVIDER NOTE - NSFOLLOWUPINSTRUCTIONS_ED_ALL_ED_FT
1. TAKE ALL MEDICATIONS AS DIRECTED.  FOR PAIN YOU CAN TAKE IBUPROFEN (MOTRIN, ADVIL) OR ACETAMINOPHEN (TYLENOL) AS NEEDED, AS DIRECTED ON PACKAGING.  2. FOLLOW UP WITH ___NEUROSURGERY AND ORTHO_______ AS DIRECTED.  YOU WERE GIVEN COPIES OF ALL LABS AND IMAGING RESULTS FROM YOUR ER VISIT--PLEASE TAKE THEM WITH YOU TO YOUR APPOINTMENT.  3. RETURN TO THE ER FOR ANY NEW OR WORSENING SYMPTOMS.    Rest, Ice, Elevate injured area  Wear splint as discussed

## 2020-09-12 NOTE — ED PROVIDER NOTE - CLINICAL SUMMARY MEDICAL DECISION MAKING FREE TEXT BOX
65 y/o with PMH SDH after fall and BL wrist fractures presents with splints c/o R wrist pain and headcahe, also dizziness, CT head with no new changes, dressing changed to R finger, circulation intact,  call placed to  for outpatient follow up, pt to be d.c home with follow up with Neurosurgery and Ortho

## 2020-09-12 NOTE — ED ADULT NURSE NOTE - NSIMPLEMENTINTERV_GEN_ALL_ED
Implemented All Fall Risk Interventions:  [place] to call system. Call bell, personal items and telephone within reach. Instruct patient to call for assistance. Room bathroom lighting operational. Non-slip footwear when patient is off stretcher. Physically safe environment: no spills, clutter or unnecessary equipment. Stretcher in lowest position, wheels locked, appropriate side rails in place. Provide visual cue, wrist band, yellow gown, etc. Monitor gait and stability. Monitor for mental status changes and reorient to person, place, and time. Review medications for side effects contributing to fall risk. Reinforce activity limits and safety measures with patient and family.

## 2020-09-12 NOTE — ED PROVIDER NOTE - PATIENT PORTAL LINK FT
You can access the FollowMyHealth Patient Portal offered by [facility] by registering at the following website: [email or web address]. By joining [employer]’s FollowMyHealth portal, you will also be able to view your health information using other applications (apps) compatible with our system.

## 2020-09-12 NOTE — CONSULT NOTE ADULT - SUBJECTIVE AND OBJECTIVE BOX
Ask to review imaging in context of trauma a few days ago. History provided by physician assistant.     64M with previous history of trauma admitted to [facility] recently with diagnosis of acute subdural hematoma. Patient was admitted for observation at [facility] and was discharged on September 9, 2020. Presented today to [facility] complaining of changing headache characteristics. Patient otherwise neurologically intact.     CT head performed today demonstrated the following:     This exam is compared prior noncontrast head CT performed on September 8, 2020.     Parenchymal volume loss is again seen     Previously noted acute subdural hematoma involving the right posterior frontal region is no longer seen. This compatible expected evolutionary changes     Prominence of bifrontal extra-axial space is again seen and increased in size. These findings could be compatible with chronic subdural hematomas/hygromas versus increase atrophy. MRI can be done to better characterize these findings. The collection on the right side measures approximately 1.0 cm in widest diameter and the collection on left side measures approximately 1.1 cm widest diameter     Evaluation of the osseous structures with on the appropriate window appears unremarkable.     The visualized paranasal sinuses mastoid and middle ear regions appear clear.     Extracalvarial soft tissue swelling/hematoma is again seen involving the high right frontal parietal region.     IMPRESSION: Previously noted acute subdural hematoma has demonstrate expected evolutionary changes     Prominence of bifrontal extra collection are again seen and increased in size.

## 2020-09-12 NOTE — ED PROVIDER NOTE - PMH
Anxiety    Chronic GERD    HTN (hypertension)    HTN (hypertension)     Anxiety    Chronic GERD    HTN (hypertension)    HTN (hypertension)    SDH (subdural hematoma)    Wrist fracture, bilateral

## 2020-09-12 NOTE — CONSULT NOTE ADULT - ASSESSMENT
64M presenting to [facility] with symptoms most consistent with post-concussion syndrome. CT head demonstrates resolution of acute subdural hematoma. Bilateral subdural hygromas are seen. No acute neurosurgical intervention required for these findings. Should have follow up with neurosurgery for serial imaging of presumed hygromas in 3-4 weeks. Can follow up with a concussion clinic for symptom management in ~1week.

## 2020-09-12 NOTE — ED PROVIDER NOTE - OBJECTIVE STATEMENT
65 y/o M with PMH HTN, GERD recently admitted to [facility] for SDH from Sept 6-9 s/p fall down stairs. Pt was d/c with f/u with ortho and neurosurgery but reports he cannot follow up with those providers as they are too far away and he does not drive. Also reports pain to R wrist, feels splint is too tight, wants his dressing changed on his R 3rd finger and reports headache. States he has had headache since fell , possible concussion but today headache is more anterior. Denies blurred vision, weakness, nausea, vomiting, difficult focusing, slurred speech. Does report dizziness with positional changes. Denies CP, fever SOB. Pt reports finding it difficult to bathe and do ADLs as he has splint on R wrist and removable splint to L wrist and requests a home care nurse. Pt lives with , pt and  are both retired,  is the only one who drives and reportedly can only drive short distances.

## 2020-09-12 NOTE — ED PROVIDER NOTE - PROGRESS NOTE DETAILS
R 3rd digit dressing changed, xerofrom and katarina placed, no erythema or drainage noted, ensured pt R splint okay, to f/u with ortho, sensation intact and cap refill good able to move all digits, pt told ot buy cadst cover or use plastic bags while showering, CT results d/c Dr Rey ( Neurosurgery) who reports likely hygromas, HA likely form concussion, pt to be d/c home with f.u in office in 1 week

## 2020-09-12 NOTE — ED PROVIDER NOTE - CARE PROVIDERS DIRECT ADDRESSES
[email or web address]

## 2020-09-12 NOTE — ED ADULT TRIAGE NOTE - CHIEF COMPLAINT QUOTE
transferred from [place] to [place] and discharged from [place] 2 days ago.  pt fell down a flight a stairs sustained a SDH and b/l wrist fractures.  pt has multiple complaints stating he was not given a homecare nurse, he is unable to f/u with the ortho he was given as it is "too far"

## 2020-09-12 NOTE — ED PROVIDER NOTE - CARE PLAN
Principal Discharge DX:	Headache  Secondary Diagnosis:	Concussion  Secondary Diagnosis:	SDH (subdural hematoma)  Secondary Diagnosis:	Wrist fracture, bilateral

## 2020-09-12 NOTE — ED PROVIDER NOTE - ATTENDING CONTRIBUTION TO CARE
65 y/o M with recent sdh and wrist fracture presents for SW/PT in home care or outpt fu.  pt states he can not care for himself.  pt is a SW/returned.  pt has help at home and there is no change on CT head.    pt to fu outpt for PT, ortho, neuro    pt stable for discharge

## 2020-09-12 NOTE — ED ADULT NURSE NOTE - CHIEF COMPLAINT QUOTE
transferred from [place] to [place] and discharged from [place] 2 days ago.  pt fell down a flight a stairs sustained a SDH and b/l wrist fractures.  pt has multiple complaints stating he was not given a homecare nurse, he is unable to f/u with the ortho he was given as it is "too far"

## 2020-09-12 NOTE — ED ADULT NURSE NOTE - OBJECTIVE STATEMENT
patient came in ED from home with father at the bedside. patient was recently discharged from [facility] for Dx of bilateral wrist fracture and SDH. on arrival patient noted alert and oriented x 4. right FA ace wrap and sling in place. left forearm splint in place. patient stated he needed Home Care and a different Neurologist and Orthopedic MD for follow up. patient added the MD's that was assigned to him were all in [place] and it will be difficult commute [place]. NP Cheri s/e patient at the bedside. patient added he is concerned about him having Concussion with headache and mild nausea still happening. patient educated by NP about concussion.

## 2020-09-12 NOTE — ED PROVIDER NOTE - PHYSICAL EXAMINATION
PERRL, speech clear, pt with laryngitis, EOMs intact, sensation intact, strentgh 5/5 and equal in BL upper extremities, strength 4/5 in LLE but pt reports normal for him s/p old stroke, 5/5 in RLE,    2cm lac to palmar surface of R 3rd digit    2cm lac to R posterior head, dried blood noted,

## 2020-09-12 NOTE — ED PROVIDER NOTE - PROVIDER TOKENS
PROVIDER:[TOKEN:[[id number]],FOLLOWUP:[4-6 Days]],PROVIDER:[TOKEN:[[id number]],FOLLOWUP:[4-6 Days]]

## 2020-09-12 NOTE — ED PROVIDER NOTE - CARE PROVIDER_API CALL
Dioni Rey  NEUROSURGERY  [address], [address]nd [place] [address]  Phone: [phone]  Fax: [phone]  Follow Up Time: 4-6 Days    Wilver Coles (DO)  Orthopaedic Surgery  [address] [place] [address]  Phone: [phone]  Fax: [phone]  Follow Up Time: 4-6 Days

## 2020-09-14 PROBLEM — F41.9 ANXIETY DISORDER, UNSPECIFIED: Chronic | Status: ACTIVE | Noted: 2020-09-06

## 2020-09-14 PROBLEM — K21.9 GASTRO-ESOPHAGEAL REFLUX DISEASE WITHOUT ESOPHAGITIS: Chronic | Status: ACTIVE | Noted: 2020-09-06

## 2020-09-14 PROBLEM — I10 ESSENTIAL (PRIMARY) HYPERTENSION: Chronic | Status: ACTIVE | Noted: 2020-09-06

## 2020-09-19 PROBLEM — S06.5X9A TRAUMATIC SUBDURAL HEMORRHAGE WITH LOSS OF CONSCIOUSNESS OF UNSPECIFIED DURATION, INITIAL ENCOUNTER: Chronic | Status: ACTIVE | Noted: 2020-09-12

## 2020-09-19 PROBLEM — S62.101A FRACTURE OF UNSPECIFIED CARPAL BONE, RIGHT WRIST, INITIAL ENCOUNTER FOR CLOSED FRACTURE: Chronic | Status: ACTIVE | Noted: 2020-09-12

## 2020-09-22 ENCOUNTER — APPOINTMENT (OUTPATIENT)
Dept: ORTHOPEDIC SURGERY | Facility: CLINIC | Age: 65
End: 2020-09-22
Payer: COMMERCIAL

## 2020-09-22 VITALS
HEART RATE: 56 BPM | BODY MASS INDEX: 27.32 KG/M2 | WEIGHT: 170 LBS | DIASTOLIC BLOOD PRESSURE: 69 MMHG | SYSTOLIC BLOOD PRESSURE: 138 MMHG | HEIGHT: 66 IN

## 2020-09-22 DIAGNOSIS — Z86.69 PERSONAL HISTORY OF OTHER DISEASES OF THE NERVOUS SYSTEM AND SENSE ORGANS: ICD-10-CM

## 2020-09-22 DIAGNOSIS — Z82.61 FAMILY HISTORY OF ARTHRITIS: ICD-10-CM

## 2020-09-22 DIAGNOSIS — Z78.9 OTHER SPECIFIED HEALTH STATUS: ICD-10-CM

## 2020-09-22 DIAGNOSIS — Z80.9 FAMILY HISTORY OF MALIGNANT NEOPLASM, UNSPECIFIED: ICD-10-CM

## 2020-09-22 DIAGNOSIS — S60.212A CONTUSION OF LEFT WRIST, INITIAL ENCOUNTER: ICD-10-CM

## 2020-09-22 PROCEDURE — 99204 OFFICE O/P NEW MOD 45 MIN: CPT

## 2020-09-22 PROCEDURE — 73110 X-RAY EXAM OF WRIST: CPT | Mod: LT

## 2020-09-23 PROBLEM — Z82.61 FAMILY HISTORY OF ARTHRITIS: Status: ACTIVE | Noted: 2020-09-23

## 2020-09-23 PROBLEM — Z80.9 FAMILY HISTORY OF MALIGNANT NEOPLASM: Status: ACTIVE | Noted: 2020-09-23

## 2020-09-23 PROBLEM — Z78.9 NON-SMOKER: Status: ACTIVE | Noted: 2020-09-23

## 2020-09-23 PROBLEM — Z86.69 HISTORY OF NEUROPATHY: Status: RESOLVED | Noted: 2020-09-23 | Resolved: 2020-09-23

## 2020-09-23 PROBLEM — Z78.9 CURRENT DRINKER OF ALCOHOL: Status: ACTIVE | Noted: 2020-09-23

## 2020-09-23 PROBLEM — Z78.9 NEVER EXERCISES: Status: ACTIVE | Noted: 2020-09-23

## 2020-09-24 PROBLEM — S60.212A CONTUSION OF WRIST, LEFT: Status: ACTIVE | Noted: 2020-09-24

## 2020-09-24 NOTE — PHYSICAL EXAM
[de-identified] : Constitutional: Well-nourished, well-developed, No acute distress\par Respiratory:  Good respiratory effort, no SOB\par Lymphatic: No regional lymphadenopathy, no lymphedema\par Psychiatric: Pleasant and normal affect, alert and oriented x3\par Skin: Clean dry and intact B/L UE/LE\par Musculoskeletal: normal except where as noted in regional exam\par \par \par Left Wrist:\par APPEARANCE: no marked deformities, no swelling or malalignment\par POSITIVE TENDERNESS: none\par NONTENDER: snuffbox, scapholunate, DRUJ, TFCC, FCU/FCR, ECU/ECRL/ECRB, radial/ulnar styloid, CMC, and MCP joints. \par ROM: full & painless. \par RESISTIVE TESTING: painless resisted wrist flexion/extension, and radial/ulnar deviation. \par SPECIAL TESTS: neg Finkelstein's. neg Phalen's. neg reverse Phalen's. neg Beaver's. neg natalia test. neg TFCC grind. neg tinel's at the carpal tunnel\par Vasc: 2+ radial pulse\par Neuro: AIN, PIN, Ulnar nerve intact to motor\par Sensation: Intact to light touch throughout\par B/L Shoulders:  No asymmetry, malalignment, or swelling, Full ROM, 5/5 strength in flexion/ext, Abd/Add, IR/ER, Joints stable\par B/L Elbows:  No asymmetry, malalignment, or swelling, Full ROM, 5/5 strength in flex/ext, pronation/supination, Joints stable\par \par Right Wrist:\par APPEARANCE: + swelling over distal radius,  no marked deformity or malalignment\par POSITIVE TENDERNESS: + Distal radius\par NONTENDER: snuffbox, scapholunate, TFCC, FCU/FCR, ECU/ECRL/ECRB, CMC, and MCP joints.\par ROM: Limited in all directions due to swelling and pain. \par RESISTIVE TESTING: Deferred due to known fracture. \par Vasc: 2+ radial pulse\par Neuro: AIN, PIN, Ulnar nerve intact to motor\par Sensation: Intact to light touch throughout\par  [de-identified] : \par The following radiographs were ordered and read by me during this patient's visit. I reviewed each radiograph in detail with the patient and discussed the findings as highlighted below. \par \par 3 views of the right wrist were obtained today that show a comminuted mildly displaced distal radius fracture.  There is no significant malalignment. There is no joint dislocation, or degenerative changes seen. No other obvious osseous abnormality. Otherwise unremarkable.\par \par 3 views of the left wrist were obtained today that show no fracture, or dislocation. There are no degenerative changes seen. There is no malalignment. No obvious osseous abnormality. Otherwise unremarkable.\par ____________\par I reviewed and clinically correlated the following outside imaging studies,\par \par \par  EXAM: WRIST-RIGHT \par \par PROCEDURE DATE: 09/06/2020 \par \par \par \par INTERPRETATION: CLINICAL INFORMATION: Post reduction. Trauma. \par \par TECHNIQUE: 2 views of the right wrist were obtained. \par COMPARISON: None. \par \par IMPRESSION: \par \par Overlying cast/splint obscures evaluation of the fine osseous and soft tissue detail. Comminuted fracture of the distal radius.

## 2020-09-24 NOTE — ADDENDUM
[FreeTextEntry1] : Of note, when the patient was exiting the facility his partner became very faint and began throwing up in the physical therapy area towards the exit of the building.  We helped him into a chair, checked his vital signs which were normal, and advised that he should be evaluated at the emergency room.  The patient and his partner refused 911 transport as they would likely be taken to [facility] and they did not want to be taken there.  We offered to call [facility] ambulance service, this would increase the likelihood that they would be taken to a [facility] facility, but after it being advised that they might end up at [facility] they again refused ambulatory services.  The patient cannot drive because of his right wrist fracture, and the patient's partner was in no condition to be driving due to the medical issue he was undergoing.  They finally agreed to utilize a ride sharing service that would transport them from our facility to [facility] which is close to their home.

## 2020-09-24 NOTE — DISCUSSION/SUMMARY
[de-identified] : Discussed findings of today's exam and possible causes of patient's pain.  Educated patient on their diagnosis of right distal radius mildly displaced comminuted fracture, and left wrist contusion without fracture.  Reviewed possible courses of treatment, and we collaboratively decided best course of treatment at this time will include conservative management.  Patient has a distal radius mildly displaced comminuted fracture, he is status post reduction in the ER, he has been wearing the splint from the ER for the last 2 weeks.  Patient was transitioned into short arm cast today.  Recommend utilizing the cast for another 2 weeks, and recommend follow-up and repeat x-ray at that time.\par Patient advised that left wrist shows no evidence of fracture on x-ray, patient is diagnosed with wrist contusion and possible mild sprain, no evidence of significant ligamentous tear or myotendinous injury.  Patient has been utilizing a volar wrist splint since time of injury on the left wrist, advised he can start to discontinue utilization of this brace particularly with ADLs at home.  \par Patient is having pain in the right wrist secondary to his fracture patient has history of chronic back pain and has been on hydrocodone previously with good relief, and is requesting this medication for pain control at this time.  A prescription of hydrocodone has been prescribed, I informed the patient that this is a narcotic pain medication and that it causes sedation and it should not be taken while operating machinery, or while caring for children/family members alone.  I also advised the patient that all narcotic pain medications have addictive potential and therefore should be taken as little as possible, and for the shortest duration needed (We discussed all possible side effects of this medication).\par   Patient appreciates and agrees with current plan.\par \par This note was generated using dragon medical dictation software.  A reasonable effort has been made for proofreading its contents, but typos may still remain.  If there are any questions or points of clarification needed please notify my office.

## 2020-09-24 NOTE — PROCEDURE
[de-identified] : Indication: Right distal radius fracture\par Cast Type: Short arm cast\par \par The above cast was applied without incident, distal neurovascular testing was intact after application.  Patient tolerated procedure well without any adverse events.

## 2020-09-24 NOTE — HISTORY OF PRESENT ILLNESS
[de-identified] : RHD Patient is here for b/l wrist pain that began on 9/6/2020 after a fall. He was taken to the ED where x rays were taken that were positive for fracture on the right wrist. He was put in a splint. He followed up with a different orthopedic practice over a week ago where repeat films were taken. Taking OxyContin and is experiencing constipation. Patient is experiencing N/T into the right fingers. He is still in the same splint that he was released from the hospital in on the right side. He was transitioned to a brace on the left. Denies N/T/R/Prior injury. Not currently working. \par \par The patient's past medical history, past surgical history, medications and allergies were reviewed by me today and documented accordingly. In addition, the patient's family and social history, which were noncontributory to this visit, were reviewed also. Intake form was reviewed

## 2020-10-05 ENCOUNTER — APPOINTMENT (OUTPATIENT)
Dept: ORTHOPEDIC SURGERY | Facility: CLINIC | Age: 65
End: 2020-10-05
Payer: COMMERCIAL

## 2020-10-05 DIAGNOSIS — S52.551A OTHER EXTRAARTICULAR FRACTURE OF LOWER END OF RIGHT RADIUS, INITIAL ENCOUNTER FOR CLOSED FRACTURE: ICD-10-CM

## 2020-10-05 PROCEDURE — 73110 X-RAY EXAM OF WRIST: CPT | Mod: RT

## 2020-10-05 PROCEDURE — 99213 OFFICE O/P EST LOW 20 MIN: CPT

## 2020-10-05 NOTE — DISCUSSION/SUMMARY
[de-identified] : Patient was seen today for reevaluation of right wrist pain due to mildly displaced comminuted distal radius fracture.  At this time there is very good callus formation at the fracture site.  Patient has been removed from short arm cast and transitioned into volar wrist splint.  Patient will be started on a course of physical therapy to restore normal range of motion and strength as tolerated.  Patient is recommended to follow-up in 2 weeks to assess his progress.  Patient is requesting a refill of narcotic pain medication for continued pain control, this would likely be needed for visits at physical therapy as well.  Patient appreciates and agrees with current plan.\par \par This note was generated using dragon medical dictation software.  A reasonable effort has been made for proofreading its contents, but typos may still remain.  If there are any questions or points of clarification needed please notify my office.

## 2020-10-05 NOTE — PHYSICAL EXAM
[de-identified] : Constitutional: Well-nourished, well-developed, No acute distress\par Respiratory:  Good respiratory effort, no SOB\par Lymphatic: No regional lymphadenopathy, no lymphedema\par Psychiatric: Pleasant and normal affect, alert and oriented x3\par Skin: Clean dry and intact B/L UE/LE\par Musculoskeletal: normal except where as noted in regional exam\par \par Right Wrist:\par APPEARANCE: mild swelling over distal radius,  no marked deformity or malalignment\par POSITIVE TENDERNESS: + moderate along distal radius\par NONTENDER: snuffbox, scapholunate, TFCC, FCU/FCR, ECU/ECRL/ECRB, CMC, and MCP joints.\par ROM: Limited in all directions due to stiffness and pain. \par RESISTIVE TESTING: 3/5 wrist flexion/extension, radial/ulnar deviation, pronation/supination \par Vasc: 2+ radial pulse\par Neuro: AIN, PIN, Ulnar nerve intact to motor\par Sensation: Intact to light touch throughout\par  [de-identified] : The following radiographs were ordered and read by me during this patient's visit. I reviewed each radiograph in detail with the patient and discussed the findings as highlighted below. \par \par 3 views of the right wrist were obtained today that show a comminuted mildly displaced distal radius fracture, with routine healing.  There is no significant malalignment. There is no joint dislocation, or degenerative changes seen. No other obvious osseous abnormality. Otherwise unremarkable.\par

## 2020-10-05 NOTE — HISTORY OF PRESENT ILLNESS
[de-identified] : Patient is here today following up after a closed extra-articular fracture of distal end of right radius. As instructed, the patient has been compliant with immobilization in a short arm cast.  Patient has had no complications or issues during immobilization, no trauma to the area, no new pain at this time. No new complaints.\par

## 2020-10-19 ENCOUNTER — APPOINTMENT (OUTPATIENT)
Dept: ORTHOPEDIC SURGERY | Facility: CLINIC | Age: 65
End: 2020-10-19
Payer: COMMERCIAL

## 2020-10-19 DIAGNOSIS — S63.282D: ICD-10-CM

## 2020-10-19 DIAGNOSIS — S52.551D OTHER EXTRAARTICULAR FRACTURE OF LOWER END OF RIGHT RADIUS, SUBSEQUENT ENCOUNTER FOR CLOSED FRACTURE WITH ROUTINE HEALING: ICD-10-CM

## 2020-10-19 DIAGNOSIS — S62.616A DISPLACED FRACTURE OF PROXIMAL PHALANX OF RIGHT LITTLE FINGER, INITIAL ENCOUNTER FOR CLOSED FRACTURE: ICD-10-CM

## 2020-10-19 DIAGNOSIS — S63.292A: ICD-10-CM

## 2020-10-19 PROCEDURE — 99072 ADDL SUPL MATRL&STAF TM PHE: CPT

## 2020-10-19 PROCEDURE — 73130 X-RAY EXAM OF HAND: CPT | Mod: RT

## 2020-10-19 PROCEDURE — 73110 X-RAY EXAM OF WRIST: CPT | Mod: RT

## 2020-10-19 PROCEDURE — 99213 OFFICE O/P EST LOW 20 MIN: CPT

## 2020-10-19 RX ORDER — OXYCODONE 5 MG/1
5 TABLET ORAL
Qty: 20 | Refills: 0 | Status: ACTIVE | COMMUNITY
Start: 2020-09-17

## 2020-10-19 RX ORDER — ALPRAZOLAM 1 MG/1
1 TABLET ORAL
Qty: 60 | Refills: 0 | Status: ACTIVE | COMMUNITY
Start: 2020-08-31

## 2020-10-19 RX ORDER — ZOLPIDEM TARTRATE 10 MG/1
10 TABLET ORAL
Qty: 30 | Refills: 0 | Status: ACTIVE | COMMUNITY
Start: 2020-07-08

## 2020-10-19 RX ORDER — OXYCODONE HYDROCHLORIDE 5 MG/1
5 CAPSULE ORAL
Qty: 9 | Refills: 0 | Status: ACTIVE | COMMUNITY
Start: 2020-09-15

## 2020-10-19 RX ORDER — LOSARTAN POTASSIUM 100 MG/1
100 TABLET, FILM COATED ORAL
Qty: 90 | Refills: 0 | Status: ACTIVE | COMMUNITY
Start: 2020-08-11

## 2020-10-19 RX ORDER — HYDROCORTISONE 2.5% 25 MG/G
2.5 CREAM TOPICAL
Qty: 30 | Refills: 0 | Status: ACTIVE | COMMUNITY
Start: 2020-03-27

## 2020-10-19 RX ORDER — LIDOCAINE 5% 700 MG/1
5 PATCH TOPICAL
Qty: 5 | Refills: 0 | Status: ACTIVE | COMMUNITY
Start: 2020-09-09

## 2020-10-19 RX ORDER — SODIUM SULFATE, POTASSIUM SULFATE, MAGNESIUM SULFATE 17.5; 3.13; 1.6 G/ML; G/ML; G/ML
17.5-3.13-1.6 SOLUTION, CONCENTRATE ORAL
Qty: 354 | Refills: 0 | Status: ACTIVE | COMMUNITY
Start: 2020-08-13

## 2020-10-21 PROBLEM — S63.282D: Noted: 2020-10-19

## 2020-10-21 PROBLEM — S63.292A: Status: ACTIVE | Noted: 2020-10-21

## 2020-10-21 PROBLEM — S52.551D CLOSED EXTRA-ARTICULAR FRACTURE OF DISTAL END OF RIGHT RADIUS WITH ROUTINE HEALING, SUBSEQUENT ENCOUNTER: Status: ACTIVE | Noted: 2020-10-05

## 2020-10-21 PROBLEM — S62.616A CLOSED DISPLACED FRACTURE OF PROXIMAL PHALANX OF RIGHT LITTLE FINGER, INITIAL ENCOUNTER: Status: ACTIVE | Noted: 2020-10-21

## 2020-10-22 NOTE — DISCUSSION/SUMMARY
[de-identified] : Patient was seen today for follow-up evaluation of right distal radius fracture.  Patient continues to have routine healing of the distal radial fracture, there is improved calcification since last evaluation.  Patient is doing well with his course of occupational therapy and is having gradual improvement in his range of motion and function.  At this time recommend continued conservative management and continuation of course of occupational therapy.  Patient is still having pain in the area, and states that the hydrocodone is still providing sufficient pain relief.  I advised the patient he needs to start weaning off of hydrocodone for his right wrist fracture as we are now more than 1 month status post injury.  Patient is given 1 more refill of hydrocodone, patient was given 14 tabs to be taken in the evening as needed, but any further medication management will be with nonnarcotic pain pills after this 2-week supply.  Patient is advised he can follow-up in 2-4 weeks for reassessment of his progress with occupational therapy regarding his right wrist fracture.\par Patient was also seen today for evaluation of right hand third digit pain, as well as right hand fifth digit pain.  Patient was previously seen for bilateral wrist pain, as well as follow-up of his right wrist fracture.  Patient states his right hand third digit has been bothering him since his evaluation in the hospital, but this issue was not evaluated specifically due to the fact that we are already managing a multitude of issues and significant pain/dysfunction from his right wrist fracture and left wrist contusion.  Today I had the opportunity to fully evaluate the right hand third digit as well as the right hand fifth digit.  On x-ray today there is evidence that patient has a dorsal dislocation of the third digit distal phalanx at the DIP joint.  I advised the patient that this would require consultation with one of my hand surgical Associates, I do not recommend manual manipulation of this dislocated joint this far removed from time of injury.  Patient was also advised that he has a right hand fifth digit proximal phalanx mildly displaced fracture, but x-ray today shows that this fracture has routine healing, and there is no significant deformity of the fifth digit, just resultant swelling and resolving ecchymosis.  Patient can have the right hand fifth digit stiffness and swelling addressed with his course of occupational therapy, but I recommend the occupational therapist not do any manipulation of the right hand third digit DIP joint dislocation until he can have hand surgery consultation.  \par I apologized to the patient for the delay in treatment and evaluation of the right hand third digit dislocation, but we were already dealing with a multitude of issues and significant pain/dysfunction with his bilateral wrist pain and his right wrist fracture that unfortunately we did not do dedicated evaluation of the right hand third digit or fifth digit on prior visits.  I advised the patient that the right wrist fracture was certainly the priority due to the severity of the injury, and that there is no disruption of neurovascular flow of the right hand third digit or fifth digit, and that intervention options would not have been different as I did not see him for initial eval until 9/22/2020 which was more than 2 weeks removed from initial date of injury of 9/6/2020 and I would not have been able to do a manipulation of the joint in the office that far removed from time of injury, patient would still require surgical consultation to address the dislocated DIP joint.\par Patient is advised I can schedule a timely consultation with one of my hand surgical Associates this week for further evaluation and management of his right hand third digit DIP joint dislocation.  Patient is concerned that my hand surgeons do not come to this office in [place], he was advised that their closest office location is in [facility], but patient feels that that will be too far for him to travel.  Patient was given a written handout of other orthopedic groups within his close proximity including [facility] in [place], [facility] which has multiple office locations, and [facility] which has multiple office locations; all of which have subspecialty hand surgery, but I am not sure which office locations offer hand surgery consultation that would accept his insurance.\par Patient appreciates and agrees with current plan.\par \par This note was generated using dragon medical dictation software.  A reasonable effort has been made for proofreading its contents, but typos may still remain.  If there are any questions or points of clarification needed please notify my office.

## 2020-10-22 NOTE — PHYSICAL EXAM
[de-identified] : Constitutional: Well-nourished, well-developed, No acute distress\par Respiratory:  Good respiratory effort, no SOB\par Lymphatic: No regional lymphadenopathy, no lymphedema\par Psychiatric: Pleasant and normal affect, alert and oriented x3\par Skin: Clean dry and intact B/L UE/LE\par Musculoskeletal: normal except where as noted in regional exam\par \par Right Wrist:\par APPEARANCE: mild swelling over distal radius,  no marked deformity or malalignment\par POSITIVE TENDERNESS: + Mild along distal radius\par NONTENDER: snuffbox, scapholunate, TFCC, FCU/FCR, ECU/ECRL/ECRB, CMC, and MCP joints.\par ROM: Limited in all directions due to stiffness and pain. \par RESISTIVE TESTING: 3/5 wrist flexion/extension, radial/ulnar deviation, pronation/supination \par Vasc: 2+ radial pulse\par Neuro: AIN, PIN, Ulnar nerve intact to motor\par Sensation: Intact to light touch throughout\par \par Right hand:\par APPEARANCE:  + swelling/deformity of the third DIP joint, + swelling and resolving ecchymosis of the fifth digit PIP joint, no marked deformities or malalignment of the other fingers\par POSITIVE TENDERNESS: Third digit DIP joint, fifth digit PIP joint\par NONTENDER: all other osseous and ligamentous structures. \par ROM: Significantly limited motion at the third digit DIP joint, mildly limited at the third digit PIP joint and MCP joint.  Mildly limited motion at the fifth digit PIP joint due to stiffness and pain.\par RESISTIVE TESTING: Painful resisted testing at third digit DIP joint, and fifth digit PIP joint. \par  [de-identified] : The following radiographs were ordered and read by me during this patient's visit. I reviewed each radiograph in detail with the patient and discussed the findings as highlighted below. \par \par 3 views of the right wrist were obtained today that show a comminuted mildly displaced distal radius fracture, with routine healing and improved calcification since last visit.  There is no significant malalignment. There is no joint dislocation, or degenerative changes seen. No other obvious osseous abnormality. Otherwise unremarkable.\par \par \par 3 views of the right hand were obtained today that show a dorsal dislocation of the third digit distal phalanx at the DIP joint, and a mildly displaced fifth digit proximal phalanx fracture, with routine healing.  No other obvious osseous abnormality. Otherwise unremarkable.

## 2020-10-22 NOTE — HISTORY OF PRESENT ILLNESS
[de-identified] : Patient is here today following up after a closed extra-articular fracture of distal end of right radius. As instructed, the patient has been compliant with immobilization in a removable wrist splint. The patient called in on 10/12/2020 to request a refill of Hydrocodone and also requested to discuss pain in his right third digit. He stated that while having his finger reduced in the hospital he was secured solely by his third digit on the first attempt. On the second, two digits were used. He stated that he had continued having pain in that area. Review of original images on PACS had a limited view of the finger and he was informed that he was welcome to get an xray done outpatient and that it could be reviewed over the phone or in the office. He elected to wait until his office visit to have the image performed. He was informed that the order had been placed should he chose to have it performed prior to his visit. He has been attending OT. He is again requesting Hydrocodone refill today. There has been no recent injury. \par

## 2020-10-22 NOTE — ADDENDUM
[FreeTextEntry1] : I notified the patient that my hand surgeon associate is available in [facility] to see him on Wednesday, 10/21/2020, patient was offered an appointment for that day, but patient stated that was too far for him to travel and he would arrange his own hand surgery consultation.\par I also spoke directly with the patient's occupational therapist, I advised her of the new findings of his right third digit and fifth digit, and advised her appropriately on continuation of occupational therapy for his right wrist fracture with avoidance of usage of the right hand third digit.

## 2020-10-23 ENCOUNTER — APPOINTMENT (OUTPATIENT)
Dept: ORTHOPEDIC SURGERY | Facility: CLINIC | Age: 65
End: 2020-10-23
Payer: COMMERCIAL

## 2020-10-23 VITALS
HEIGHT: 66 IN | SYSTOLIC BLOOD PRESSURE: 139 MMHG | BODY MASS INDEX: 27.32 KG/M2 | DIASTOLIC BLOOD PRESSURE: 80 MMHG | HEART RATE: 65 BPM | WEIGHT: 170 LBS

## 2020-10-23 DIAGNOSIS — S62.626G: ICD-10-CM

## 2020-10-23 DIAGNOSIS — S62.609G: ICD-10-CM

## 2020-10-23 PROCEDURE — 99214 OFFICE O/P EST MOD 30 MIN: CPT

## 2020-10-23 PROCEDURE — 73140 X-RAY EXAM OF FINGER(S): CPT | Mod: 59,RT

## 2020-10-23 PROCEDURE — 99072 ADDL SUPL MATRL&STAF TM PHE: CPT

## 2020-11-04 ENCOUNTER — NON-APPOINTMENT (OUTPATIENT)
Age: 65
End: 2020-11-04

## 2020-11-04 RX ORDER — HYDROCODONE BITARTRATE AND ACETAMINOPHEN 5; 325 MG/1; MG/1
5-325 TABLET ORAL
Qty: 14 | Refills: 0 | Status: ACTIVE | COMMUNITY
Start: 2020-09-22 | End: 1900-01-01

## 2020-11-09 ENCOUNTER — APPOINTMENT (OUTPATIENT)
Dept: ORTHOPEDIC SURGERY | Facility: CLINIC | Age: 65
End: 2020-11-09

## 2020-12-09 ENCOUNTER — APPOINTMENT (OUTPATIENT)
Dept: ORTHOPEDIC SURGERY | Facility: CLINIC | Age: 65
End: 2020-12-09

## 2020-12-15 ENCOUNTER — NON-APPOINTMENT (OUTPATIENT)
Age: 65
End: 2020-12-15

## 2020-12-21 ENCOUNTER — APPOINTMENT (OUTPATIENT)
Dept: ORTHOPEDIC SURGERY | Facility: CLINIC | Age: 65
End: 2020-12-21

## 2021-01-25 NOTE — DISCHARGE NOTE NURSING/CASE MANAGEMENT/SOCIAL WORK - NSDCPETBCESMAN_GEN_ALL_CORE
If you are a smoker, it is important for your health to stop smoking. Please be aware that second hand smoke is also harmful. 65 yo f hx septic stone s/p stent placement 10/28 and lithotripsy w/ stent exchange 12/11 w/ Sagalovich, ESBL UTI (sens to meropenem, bactrim)  pt presents for dysuria for several days. pt was admitted oct for septic stone and had procedure in december. pt was dx w/ covid 12/24. pt was scheduled to get stent removed early January but has not yet f/u because she did not get a follow up negative covid test. pt endorsing several days of dysuria w/ mild b/l flank/back pain. no hematuria. no nauesa/vomiting.     vss  gen- NAD, aaox3  card-rrr  lungs-ctab, no wheezing or rhonchi  abd-sntnd, no guarding or rebound, no cvat  neuro- full str/sensation, cn ii-xii grossly intact, normal coordination and gait    c/f uti, SIRS negative, pt w/ stent in place - will assess location, r/o retained stone in setting of UTI sx  belly labs, ua, uctx, ctap, uro c/s 67 yo f hx septic stone s/p stent placement 10/28 and lithotripsy w/ stent exchange 12/11 w/ Lashawnalovich, ESBL UTI (sens to meropenem, bactrim)  pt presents for dysuria w/ suprapubic discomfort for several days. pt was admitted oct for septic stone and had procedure in december. pt was dx w/ covid 12/24. pt was scheduled to get stent removed early January but has not yet f/u because she did not get a follow up negative covid test. pt endorsing several days of dysuria/suprapubic discomfort. no back/flank pain. no f/c/n/v.      vss  gen- NAD, aaox3  card-rrr  lungs-ctab, no wheezing or rhonchi  abd-sntnd, no guarding or rebound, no cvat  neuro- full str/sensation, cn ii-xii grossly intact, normal coordination and gait    c/f uti, SIRS negative, pt w/ stent in place - will assess location  clinically very well appearing, no clinical evidence of pyelo, no flank pain/ colicky pain suggestive of kidney stone in ureter  belly labs, ua, uctx, uro c/s

## 2021-03-31 ENCOUNTER — TRANSCRIPTION ENCOUNTER (OUTPATIENT)
Age: 66
End: 2021-03-31

## 2021-05-14 ENCOUNTER — APPOINTMENT (OUTPATIENT)
Dept: OTOLARYNGOLOGY | Facility: CLINIC | Age: 66
End: 2021-05-14

## 2021-06-02 NOTE — ED ADULT NURSE NOTE - ISOLATION TYPE:
None Propranolol Pregnancy And Lactation Text: This medication is Pregnancy Category C and it isn't known if it is safe during pregnancy. It is excreted in breast milk.

## 2021-06-25 ENCOUNTER — APPOINTMENT (OUTPATIENT)
Dept: OTOLARYNGOLOGY | Facility: CLINIC | Age: 66
End: 2021-06-25

## 2021-08-12 NOTE — PATIENT PROFILE ADULT - ..
Reason for Call:  Other Group home [phone]  Can ask for either Emily or Spring    Best Time: any    Can we leave a detailed message on this number? NO    Call taken on 8/12/2021 at 5:17 PM by Crissy Ricks       07-Sep-2020 01:10:07

## 2021-11-03 ENCOUNTER — APPOINTMENT (OUTPATIENT)
Dept: OTOLARYNGOLOGY | Facility: CLINIC | Age: 66
End: 2021-11-03

## 2022-01-11 ENCOUNTER — APPOINTMENT (OUTPATIENT)
Dept: OTOLARYNGOLOGY | Facility: CLINIC | Age: 67
End: 2022-01-11

## 2022-01-13 NOTE — HISTORY OF PRESENT ILLNESS
[de-identified] : JAYLEN RIVERA is a 63 year old male who comes in complaining of The persistent hoarseness and reflux. He has been taking Protonix before breakfast and Zantac before dinner but not following a good antireflux diet. He has been not taking care of himself since the death of this significant other. He comes in with the early bothersome hoarseness. I had last seen him more than a year ago when he had not followed up. He had not gone for speech therapy either. He has a history of a septal deflection.The patient had no other ear nose or throat complaints at this visit.

## 2022-01-13 NOTE — ASSESSMENT
[FreeTextEntry1] : It was my freshened that he has a deviated nasal septum and chronic rhinitis. He has a significant dysphonia which is a combination of marked LPR and hyperfunction.  I again recommended an antireflux diet which he has not been following. He has been taking Zantac before dinner and I suggested to switch to at bedtime.  I suggested, again, speech therapy and would like to see him back in followup after
The patient is a 65y Male complaining of

## 2022-01-13 NOTE — ADDENDUM
[FreeTextEntry1] : 1/4/22-  c spine scan results noted.  RLP\par 1/13/22  upper GI note- Dr. Carrizales- reviewed-- esophagitis without Theodore's

## 2022-01-13 NOTE — CONSULT LETTER
[FreeTextEntry2] : Dr. Carrizales [FreeTextEntry1] : \par \par Dear  Dr. Carrizales,\par \par I had the pleasure of seeing your patient today.  \par Please see my note below.\par \par \par Thank you very much for allowing me to participate in the care of your patient.\par \par Sincerely,\par \par \par Gilbert Conde MD\par [facility]\par [facility]\par  [facility]\par \par

## 2022-01-13 NOTE — PHYSICAL EXAM
[FreeTextEntry1] : General:\par The patient was alert and oriented and in no distress.\par Voice was clear.\par He had the obvious breathy dysphonia with high pitch\par \par Ears:\par The external ears were normal without deformity.\par The ear canals were clear.\par The tympanic membranes were intact and normal.\par \par Oral cavity:\par The oral mucosa was normal.\par The oral and base of tongue were clear and without mass.\par The gingival and buccal mucosa were moist and without lesions.\par The palate moved well.\par There was no cleft to the palate.\par There appeared to be good salivary flow.  \par There was no pus, erythema or mass in the oral cavity.\par \par Neck: \par The neck was symmetrical.\par The parotid and submandibular glands were normal without masses.\par The trachea was midline and there was no unusual crepitus.\par The thyroid was smooth and nontender and no masses were palpated.\par There was no significant cervical adenopathy.\par \par Nasal endoscopy: \par CPT 23253\par Procedure Note:\par \par Nasal endoscopy was done with topical anesthesia of Pontocaine and Afrin and a      nasal endoscope.\par Indication: Nasal congestion, rule out sinusitis.\par Procedure: The nasal cavity was anesthetized with topical Afrin and Pontocaine. An  endoscope was used and inserted into the nasal cavity.\par Attention was first paid to the anterior nasal cavity.\par Endocoscopy was performed to inspect the interior of the nasal cavity, the nasal septum,  the middle and superior meati, the inferior, middle and superior turbinates, and the spheno-ethmoidal  recesses, the nasopharynx and eustachian tube orifices bilaterally. \par All findings were normal except:\par This showed mucosa was slightly boggy with a marked S-shaped deflection but no polyps, purulence or masses.\par \par \par Flexible fiberoptic laryngoscopy: CPT 18196\par Indications: Dysphagia\par Procedure note:\par  \par Flexible fiberoptic laryngoscopy was performed because of dysphagia and the patient's inability to tolerate adequate mirror examination.\par The nasal cavity was anesthetized with Pontocaine plus Afrin.\par \par \par The nasal cavity was anesthetized with Pontocaine and Afrin.\par The flexible endoscope was placed into the patient's nasal cavity.\par The nasopharynx was without masses.\par The oropharynx, vallecula and base of tongue had no masses.\par The epiglottis, aryepiglottic folds and false vocal cords were normal.\par The pyriform sinuses were without mucosal lesions or pooling of secretions.  \par The laryngeal ventricles were without lesions.\par The visualized subglottis was without mass.\par The lateral and posterior pharyngeal walls were clear and symmetrical.\par The vocal folds were clear and mobile; they abducted and adducted normally.\par There was no interarytenoid mass or fullness.\par There was significant posterior laryngeal inflammation consistent with reflux.\par Video documentation was done.\par The vocal folds and cells were actually clear and mobile but he had a significant hyper functional disorder and marked endolaryngeal inflammation.

## 2022-02-06 ENCOUNTER — TRANSCRIPTION ENCOUNTER (OUTPATIENT)
Age: 67
End: 2022-02-06

## 2022-02-17 ENCOUNTER — TRANSCRIPTION ENCOUNTER (OUTPATIENT)
Age: 67
End: 2022-02-17

## 2022-11-29 ENCOUNTER — APPOINTMENT (OUTPATIENT)
Dept: ORTHOPEDIC SURGERY | Facility: CLINIC | Age: 67
End: 2022-11-29
Payer: MEDICARE

## 2022-11-29 DIAGNOSIS — M17.12 UNILATERAL PRIMARY OSTEOARTHRITIS, LEFT KNEE: ICD-10-CM

## 2022-11-29 PROCEDURE — 99203 OFFICE O/P NEW LOW 30 MIN: CPT

## 2022-11-29 PROCEDURE — 73564 X-RAY EXAM KNEE 4 OR MORE: CPT | Mod: LT

## 2022-11-29 NOTE — HISTORY OF PRESENT ILLNESS
[de-identified] : 67-year-old male with a chief complaint of left knee pain.  He reports pain for several years gradually worsening.  He walks without assistive device uses a knee sleeve.  He is referred by Dr. Edna Castillo.  He takes narcotic pain medicine for his back.  It does not seem to be helping with his knee pain.  He denies any locking catching instability denies any trauma.  He does not trust his knee\par \par The patient's past medical history, past surgical history, medications, allergies, and social history were reviewed by me today with the patient and documented accordingly. In addition, the patient's family history, which is noncontributory to this visit, was also reviewed.\par

## 2022-11-29 NOTE — DISCUSSION/SUMMARY
[de-identified] : Longstanding left knee pain.  Significant joint line tenderness.  Using a brace.  Has had injections in the past with Dr. Castillo.  We will obtain MRI to further evaluate he will follow-up after mri

## 2022-11-29 NOTE — PHYSICAL EXAM
[de-identified] : General Exam\par \par Well developed, well nourished\par No apparent distress\par Oriented to person, place, and time\par Mood: Normal\par Affect: Normal\par Balance and coordination: Normal\par Gait: Normal\par \par left knee exam\par \par Skin: Clean, dry, intact\par Inspection: No obvious malalignment, no masses, no swelling, no effusion.\par Tenderness: + MJLT. + LJLT. +  tenderness over the medial and lateral patella facets. No ttp medial/lateral epicondyle, patella tendon, tibial tubercle, pes anserinus, or proximal fibula.\par ROM: 0 to 130° no pain with deep flexion in both knees\par Stability: Stable to varus, valgus, lachman testing. Negative anterior/posterior drawer.\par Additional tests: Negative McMurrays test, Negative patellar grind test. \par Strength: 5/5 Q/H/TA/GS/EHL, no atrophy\par Neuro: In tact to light touch throughout in dp/sp/tib/tita/saph nerve districutions, DTR's normal\par Pulses: 2+ DP/PT pulses.\par  [de-identified] : \par The following radiographs were ordered and read by me during this patients visit. I reviewed each radiograph in detail with the patient and discussed the findings as highlighted below. \par \par 4 views left knee were obtained today that show no fracture, dislocation. There is no degenerative change seen. There is no malalignment. No obvious osseous abnormality. Otherwise unremarkable.

## 2023-01-03 ENCOUNTER — APPOINTMENT (OUTPATIENT)
Dept: ORTHOPEDIC SURGERY | Facility: CLINIC | Age: 68
End: 2023-01-03
Payer: MEDICARE

## 2023-01-03 DIAGNOSIS — G89.29 PAIN IN LEFT KNEE: ICD-10-CM

## 2023-01-03 DIAGNOSIS — M25.562 PAIN IN LEFT KNEE: ICD-10-CM

## 2023-01-03 PROCEDURE — 99214 OFFICE O/P EST MOD 30 MIN: CPT | Mod: 25

## 2023-01-03 PROCEDURE — 20610 DRAIN/INJ JOINT/BURSA W/O US: CPT | Mod: LT

## 2023-01-03 PROCEDURE — 73502 X-RAY EXAM HIP UNI 2-3 VIEWS: CPT | Mod: LT

## 2023-01-03 NOTE — DISCUSSION/SUMMARY
[de-identified] : Left knee patellofemoral arthritis.  I discussed the treatment of degenerative arthritis with the patient at length today. I described the spectrum of treatment from nonoperative modalities to total joint arthroplasty. Noninvasive and nonoperative treatment modalities include weight reduction, activity modification with low impact exercise, PRN use of acetaminophen or anti-inflammatory medication if tolerated, glucosamine/chondroitin supplements, and physical therapy. Further treatments can include corticosteroid injection and the use of hyaluronic acid injections. Definitive treatment can certainly include total joint arthroplasty also. The risks and benefits of each treatment options was discussed and all questions were answered.\par \par Injection: Left knee joint.\par Indication: Patellofemoral arthritis.\par \par A discussion was had with the patient regarding this procedure and all questions were answered. All risks, benefits and alternatives were discussed. These included but were not limited to bleeding, infection, and allergic reaction. Alcohol was used to clean the skin, and betadine was used to sterilize and prep the area in the supero-lateral aspect of the left knee. Ethyl chloride spray was then used as a topical anesthetic. A 21-gauge needle was used to inject 4cc of 1% lidocaine and 1cc of 40mg/ml methylprednisolone into the knee. A sterile bandage was then applied. The patient tolerated the procedure well and there were no complications. \par \par Encourage physical therapy home exercise program.  Tylenol as needed for pain.  Follow-up as needed.

## 2023-01-03 NOTE — PHYSICAL EXAM
[de-identified] : left knee exam\par \par Skin: Clean, dry, intact\par Inspection: No obvious malalignment, no masses, no swelling, no effusion.\par Tenderness: + MJLT. + LJLT. + tenderness over the medial and lateral patella facets. No ttp medial/lateral epicondyle, patella tendon, tibial tubercle, pes anserinus, or proximal fibula.\par ROM: 0 to 130° no pain with deep flexion in both knees\par Stability: Stable to varus, valgus, lachman testing. Negative anterior/posterior drawer.\par Additional tests: Negative McMurrays test, Negative patellar grind test. \par Strength: 5/5 Q/H/TA/GS/EHL, no atrophy\par Neuro: In tact to light touch throughout in dp/sp/tib/tita/saph nerve districutions, DTR's normal\par Pulses: 2+ DP/PT pulses. [de-identified] : \par The following radiographs were ordered and read by me during this patients visit. I reviewed each radiograph in detail with the patient and discussed the findings as highlighted below. \par \par AP pelvis AP lateral left hip were obtained today.  Hip joint spaces are well-maintained.  There is normal alignment\par \par MRI left knee reviewed.  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  Ligaments and meniscus intact.

## 2023-01-03 NOTE — HISTORY OF PRESENT ILLNESS
[de-identified] : 67-year-old male with a chief complaint of left knee pain. He reports pain for several years gradually worsening. He walks without assistive device uses a knee sleeve. He is referred by Dr. Edna Castillo. He takes narcotic pain medicine for his back. It does not seem to be helping with his knee pain. He denies any locking catching instability denies any trauma. He does not trust his knee\par Since his last visit he reports worsening pain.  He is here today to review his MRI

## 2023-01-18 NOTE — ED PROVIDER NOTE - CONSTITUTIONAL NEGATIVE STATEMENT, MLM
[FreeTextEntry1] : Will renew verapamil\par renew gabapentin\par anjali ambien No no fever and no chills.

## 2023-08-16 NOTE — PROGRESS NOTE ADULT - SUBJECTIVE AND OBJECTIVE BOX
Restorative Technician Note      Patient Name: Lisa Villalta     Restorative Tech Visit Date: 08/16/23  Note Type: Mobility  Patient Position Upon Consult: Seated edge of bed  Activity Performed: Ambulated  Assistive Device: Cane  Patient Position at End of Consult: Bedside chair;  All needs within reach INTERVAL HPI/OVERNIGHT EVENTS:  Patient was evaluated at bedside in no acute distress. No acute events overnight. Patient repots pain over his neck, back and arms, but id under control with medications. Patient is tolerating regular diet, having BM and voiding spontaneously. Denies shortness of breath, headaches, chest pain, constipation, nausea, or vomiting.    MEDICATIONS  (STANDING):  acetaminophen   Tablet .. 975 milliGRAM(s) Oral every 8 hours  aspirin  chewable 81 milliGRAM(s) Oral daily  enoxaparin Injectable 40 milliGRAM(s) SubCutaneous daily  famotidine    Tablet 20 milliGRAM(s) Oral two times a day  gabapentin 300 milliGRAM(s) Oral every 8 hours  hydrochlorothiazide 25 milliGRAM(s) Oral daily  lisinopril 10 milliGRAM(s) Oral daily  pantoprazole    Tablet 40 milliGRAM(s) Oral before breakfast  polyethylene glycol 3350 17 Gram(s) Oral daily    MEDICATIONS  (PRN):  ALPRAZolam 1 milliGRAM(s) Oral three times a day PRN anxiety  HYDROmorphone  Injectable 0.5 milliGRAM(s) IV Push every 6 hours PRN break thru pain  ondansetron Injectable 4 milliGRAM(s) IV Push every 6 hours PRN Nausea  oxyCODONE    IR 5 milliGRAM(s) Oral every 6 hours PRN Moderate Pain (4 - 6)  oxyCODONE    IR 10 milliGRAM(s) Oral every 4 hours PRN Severe Pain (7 - 10)  zolpidem 5 milliGRAM(s) Oral at bedtime PRN Insomnia  zolpidem 5 milliGRAM(s) Oral at bedtime PRN Insomnia      Vital Signs Last 24 Hrs  T(C): 36.8 (09 Sep 2020 07:20), Max: 37.1 (08 Sep 2020 19:25)  T(F): 98.3 (09 Sep 2020 07:20), Max: 98.7 (08 Sep 2020 19:25)  HR: 68 (09 Sep 2020 07:20) (65 - 83)  BP: 147/65 (09 Sep 2020 07:20) (136/63 - 150/71)  BP(mean): --  RR: 17 (09 Sep 2020 07:20) (17 - 18)  SpO2: 95% (09 Sep 2020 07:20) (94% - 96%)    Constitutional: NAD, well-groomed, well-developed  HEENT: PERRLA, EOMI, right periocular ecchymosis  Neck: No bruits; no thyromegaly or nodules,  No JVD  Respiratory: Breath Sounds equal & clear to percussion & auscultation, no accessory muscle use  Cardiovascular: Regular rate & rhythm, normal S1, S2; no murmurs, gallops or rubs; no S3, S4  Gastrointestinal: Soft, non-tender, no hepatosplenomegaly, normal bowel sounds  Extremities: right upper extremity with splin intact and covered with ACE wrap, right index finger with 1.5cm laceration covered with xeroform and dry Dinesh without active bleeding. Left upper extremity with splint in place.  Vascular: Equal and normal pulses: 2+ peripheral pulses throughout    I&O's Detail    08 Sep 2020 07:01  -  09 Sep 2020 07:00  --------------------------------------------------------  IN:    Oral Fluid: 120 mL  Total IN: 120 mL    OUT:  Total OUT: 0 mL    Total NET: 120 mL          LABS:                        11.7   5.39  )-----------( 216      ( 08 Sep 2020 05:48 )             37.6     09-08    137  |  102  |  22.0<H>  ----------------------------<  112<H>  3.9   |  25.0  |  0.74    Ca    8.9      08 Sep 2020 05:48  Phos  3.3     09-08  Mg     2.5     09-08            RADIOLOGY & ADDITIONAL STUDIES:

## 2023-10-05 ENCOUNTER — APPOINTMENT (OUTPATIENT)
Dept: OTOLARYNGOLOGY | Facility: CLINIC | Age: 68
End: 2023-10-05
Payer: MEDICARE

## 2023-10-05 DIAGNOSIS — C32.0 MALIGNANT NEOPLASM OF GLOTTIS: ICD-10-CM

## 2023-10-05 PROCEDURE — 31575 DIAGNOSTIC LARYNGOSCOPY: CPT

## 2023-10-05 PROCEDURE — 99204 OFFICE O/P NEW MOD 45 MIN: CPT | Mod: 25

## 2023-10-06 ENCOUNTER — APPOINTMENT (OUTPATIENT)
Dept: CT IMAGING | Facility: CLINIC | Age: 68
End: 2023-10-06

## 2024-02-02 NOTE — H&P ADULT - ATTENDING COMMENTS
I have seen and examined the patient on arrival.  trauma B  fall down stairs evaluated at OSH  ABCDE intact, HD normal  CT here does not evidence SDH,   Ortho consult for wrist fractures.  admit to trauma   OT/PT eval  no need for fusther CT of head. gabriel all pertinent systems normal

## 2024-10-07 NOTE — ED ADULT NURSE NOTE - CAS DISCH BELONGINGS RETURNED
Topical Clindamycin Pregnancy And Lactation Text: This medication is Pregnancy Category B and is considered safe during pregnancy. It is unknown if it is excreted in breast milk. Benzoyl Peroxide Counseling: Patient counseled that medicine may cause skin irritation and bleach clothing.  In the event of skin irritation, the patient was advised to reduce the amount of the drug applied or use it less frequently.   The patient verbalized understanding of the proper use and possible adverse effects of benzoyl peroxide.  All of the patient's questions and concerns were addressed. Spironolactone Pregnancy And Lactation Text: This medication can cause feminization of the male fetus and should be avoided during pregnancy. The active metabolite is also found in breast milk. High Dose Vitamin A Counseling: Side effects reviewed, pt to contact office should one occur. Dapsone Pregnancy And Lactation Text: This medication is Pregnancy Category C and is not considered safe during pregnancy or breast feeding. Isotretinoin Counseling: Patient should get monthly blood tests, not donate blood, not drive at night if vision affected, not share medication, and not undergo elective surgery for 6 months after tx completed. Side effects reviewed, pt to contact office should one occur. Sarecycline Pregnancy And Lactation Text: This medication is Pregnancy Category D and not consider safe during pregnancy. It is also excreted in breast milk. Azelaic Acid Counseling: Patient counseled that medicine may cause skin irritation and to avoid applying near the eyes.  In the event of skin irritation, the patient was advised to reduce the amount of the drug applied or use it less frequently.   The patient verbalized understanding of the proper use and possible adverse effects of azelaic acid.  All of the patient's questions and concerns were addressed. Tazorac Pregnancy And Lactation Text: This medication is not safe during pregnancy. It is unknown if this medication is excreted in breast milk. Birth Control Pills Counseling: Birth Control Pill Counseling: I discussed with the patient the potential side effects of OCPs including but not limited to increased risk of stroke, heart attack, thrombophlebitis, deep venous thrombosis, hepatic adenomas, breast changes, GI upset, headaches, and depression.  The patient verbalized understanding of the proper use and possible adverse effects of OCPs. All of the patient's questions and concerns were addressed. Birth Control Pills Pregnancy And Lactation Text: This medication should be avoided if pregnant and for the first 30 days post-partum. Aklief counseling:  Patient advised to apply a pea-sized amount only at bedtime and wait 30 minutes after washing their face before applying.  If too drying, patient may add a non-comedogenic moisturizer.  The most commonly reported side effects including irritation, redness, scaling, dryness, stinging, burning, itching, and increased risk of sunburn.  The patient verbalized understanding of the proper use and possible adverse effects of retinoids.  All of the patient's questions and concerns were addressed. Winlevi Counseling:  I discussed with the patient the risks of topical clascoterone including but not limited to erythema, scaling, itching, and stinging. Patient voiced their understanding. Erythromycin Counseling:  I discussed with the patient the risks of erythromycin including but not limited to GI upset, allergic reaction, drug rash, diarrhea, increase in liver enzymes, and yeast infections. Topical Retinoid Pregnancy And Lactation Text: This medication is Pregnancy Category C. It is unknown if this medication is excreted in breast milk. Doxycycline Counseling:  Patient counseled regarding possible photosensitivity and increased risk for sunburn.  Patient instructed to avoid sunlight, if possible.  When exposed to sunlight, patients should wear protective clothing, sunglasses, and sunscreen.  The patient was instructed to call the office immediately if the following severe adverse effects occur:  hearing changes, easy bruising/bleeding, severe headache, or vision changes.  The patient verbalized understanding of the proper use and possible adverse effects of doxycycline.  All of the patient's questions and concerns were addressed. High Dose Vitamin A Pregnancy And Lactation Text: High dose vitamin A therapy is contraindicated during pregnancy and breast feeding. Tetracycline Counseling: Patient counseled regarding possible photosensitivity and increased risk for sunburn.  Patient instructed to avoid sunlight, if possible.  When exposed to sunlight, patients should wear protective clothing, sunglasses, and sunscreen.  The patient was instructed to call the office immediately if the following severe adverse effects occur:  hearing changes, easy bruising/bleeding, severe headache, or vision changes.  The patient verbalized understanding of the proper use and possible adverse effects of tetracycline.  All of the patient's questions and concerns were addressed. Patient understands to avoid pregnancy while on therapy due to potential birth defects. Topical Sulfur Applications Counseling: Topical Sulfur Counseling: Patient counseled that this medication may cause skin irritation or allergic reactions.  In the event of skin irritation, the patient was advised to reduce the amount of the drug applied or use it less frequently.   The patient verbalized understanding of the proper use and possible adverse effects of topical sulfur application.  All of the patient's questions and concerns were addressed. Bactrim Counseling:  I discussed with the patient the risks of sulfa antibiotics including but not limited to GI upset, allergic reaction, drug rash, diarrhea, dizziness, photosensitivity, and yeast infections.  Rarely, more serious reactions can occur including but not limited to aplastic anemia, agranulocytosis, methemoglobinemia, blood dyscrasias, liver or kidney failure, lung infiltrates or desquamative/blistering drug rashes. Benzoyl Peroxide Pregnancy And Lactation Text: This medication is Pregnancy Category C. It is unknown if benzoyl peroxide is excreted in breast milk. Dapsone Counseling: I discussed with the patient the risks of dapsone including but not limited to hemolytic anemia, agranulocytosis, rashes, methemoglobinemia, kidney failure, peripheral neuropathy, headaches, GI upset, and liver toxicity.  Patients who start dapsone require monitoring including baseline LFTs and weekly CBCs for the first month, then every month thereafter.  The patient verbalized understanding of the proper use and possible adverse effects of dapsone.  All of the patient's questions and concerns were addressed. Isotretinoin Pregnancy And Lactation Text: This medication is Pregnancy Category X and is considered extremely dangerous during pregnancy. It is unknown if it is excreted in breast milk. Spironolactone Counseling: Patient advised regarding risks of diarrhea, abdominal pain, hyperkalemia, birth defects (for female patients), liver toxicity and renal toxicity. The patient may need blood work to monitor liver and kidney function and potassium levels while on therapy. The patient verbalized understanding of the proper use and possible adverse effects of spironolactone.  All of the patient's questions and concerns were addressed. Azelaic Acid Pregnancy And Lactation Text: This medication is considered safe during pregnancy and breast feeding. Topical Clindamycin Counseling: Patient counseled that this medication may cause skin irritation or allergic reactions.  In the event of skin irritation, the patient was advised to reduce the amount of the drug applied or use it less frequently.   The patient verbalized understanding of the proper use and possible adverse effects of clindamycin.  All of the patient's questions and concerns were addressed. Azithromycin Counseling:  I discussed with the patient the risks of azithromycin including but not limited to GI upset, allergic reaction, drug rash, diarrhea, and yeast infections. Detail Level: Detailed Use Enhanced Medication Counseling?: No Minocycline Counseling: Patient advised regarding possible photosensitivity and discoloration of the teeth, skin, lips, tongue and gums.  Patient instructed to avoid sunlight, if possible.  When exposed to sunlight, patients should wear protective clothing, sunglasses, and sunscreen.  The patient was instructed to call the office immediately if the following severe adverse effects occur:  hearing changes, easy bruising/bleeding, severe headache, or vision changes.  The patient verbalized understanding of the proper use and possible adverse effects of minocycline.  All of the patient's questions and concerns were addressed. Winlevi Pregnancy And Lactation Text: This medication is considered safe during pregnancy and breastfeeding. Topical Retinoid counseling:  Patient advised to apply a pea-sized amount only at bedtime and wait 30 minutes after washing their face before applying.  If too drying, patient may add a non-comedogenic moisturizer. The patient verbalized understanding of the proper use and possible adverse effects of retinoids.  All of the patient's questions and concerns were addressed. Doxycycline Pregnancy And Lactation Text: This medication is Pregnancy Category D and not consider safe during pregnancy. It is also excreted in breast milk but is considered safe for shorter treatment courses. Aklief Pregnancy And Lactation Text: It is unknown if this medication is safe to use during pregnancy.  It is unknown if this medication is excreted in breast milk.  Breastfeeding women should use the topical cream on the smallest area of the skin for the shortest time needed while breastfeeding.  Do not apply to nipple and areola. Tazorac Counseling:  Patient advised that medication is irritating and drying.  Patient may need to apply sparingly and wash off after an hour before eventually leaving it on overnight.  The patient verbalized understanding of the proper use and possible adverse effects of tazorac.  All of the patient's questions and concerns were addressed. Bactrim Pregnancy And Lactation Text: This medication is Pregnancy Category D and is known to cause fetal risk.  It is also excreted in breast milk. Erythromycin Pregnancy And Lactation Text: This medication is Pregnancy Category B and is considered safe during pregnancy. It is also excreted in breast milk. Sarecycline Counseling: Patient advised regarding possible photosensitivity and discoloration of the teeth, skin, lips, tongue and gums.  Patient instructed to avoid sunlight, if possible.  When exposed to sunlight, patients should wear protective clothing, sunglasses, and sunscreen.  The patient was instructed to call the office immediately if the following severe adverse effects occur:  hearing changes, easy bruising/bleeding, severe headache, or vision changes.  The patient verbalized understanding of the proper use and possible adverse effects of sarecycline.  All of the patient's questions and concerns were addressed. Azithromycin Pregnancy And Lactation Text: This medication is considered safe during pregnancy and is also secreted in breast milk. Topical Sulfur Applications Pregnancy And Lactation Text: This medication is Pregnancy Category C and has an unknown safety profile during pregnancy. It is unknown if this topical medication is excreted in breast milk. Yes
